# Patient Record
Sex: FEMALE | Race: ASIAN | NOT HISPANIC OR LATINO | URBAN - METROPOLITAN AREA
[De-identification: names, ages, dates, MRNs, and addresses within clinical notes are randomized per-mention and may not be internally consistent; named-entity substitution may affect disease eponyms.]

---

## 2024-01-30 RX ORDER — ACETAMINOPHEN 500 MG
1 TABLET ORAL
Refills: 0 | DISCHARGE
Start: 2024-01-30

## 2024-01-30 RX ORDER — ONDANSETRON 8 MG/1
1 TABLET, FILM COATED ORAL
Refills: 0 | DISCHARGE
Start: 2024-01-30

## 2024-02-02 ENCOUNTER — INPATIENT (INPATIENT)
Facility: HOSPITAL | Age: 34
LOS: 4 days | Discharge: HOME | End: 2024-02-07
Attending: INTERNAL MEDICINE | Admitting: INTERNAL MEDICINE
Payer: MEDICAID

## 2024-02-02 VITALS
HEART RATE: 137 BPM | OXYGEN SATURATION: 99 % | TEMPERATURE: 103 F | SYSTOLIC BLOOD PRESSURE: 105 MMHG | DIASTOLIC BLOOD PRESSURE: 66 MMHG | RESPIRATION RATE: 18 BRPM

## 2024-02-02 DIAGNOSIS — L03.213 PERIORBITAL CELLULITIS: ICD-10-CM

## 2024-02-02 LAB
ALBUMIN SERPL ELPH-MCNC: 3.5 G/DL — SIGNIFICANT CHANGE UP (ref 3.3–5)
ALP SERPL-CCNC: 336 U/L — HIGH (ref 40–120)
ALT FLD-CCNC: 16 U/L — SIGNIFICANT CHANGE UP (ref 4–33)
ANION GAP SERPL CALC-SCNC: 16 MMOL/L — HIGH (ref 7–14)
ANISOCYTOSIS BLD QL: SLIGHT — SIGNIFICANT CHANGE UP
APTT BLD: 36.9 SEC — HIGH (ref 24.5–35.6)
AST SERPL-CCNC: 16 U/L — SIGNIFICANT CHANGE UP (ref 4–32)
BASE EXCESS BLDV CALC-SCNC: -2.8 MMOL/L — LOW (ref -2–3)
BASOPHILS # BLD AUTO: 0 K/UL — SIGNIFICANT CHANGE UP (ref 0–0.2)
BASOPHILS NFR BLD AUTO: 0 % — SIGNIFICANT CHANGE UP (ref 0–2)
BILIRUB SERPL-MCNC: 0.6 MG/DL — SIGNIFICANT CHANGE UP (ref 0.2–1.2)
BLD GP AB SCN SERPL QL: NEGATIVE — SIGNIFICANT CHANGE UP
BLOOD GAS VENOUS COMPREHENSIVE RESULT: SIGNIFICANT CHANGE UP
BUN SERPL-MCNC: 12 MG/DL — SIGNIFICANT CHANGE UP (ref 7–23)
CALCIUM SERPL-MCNC: 8.9 MG/DL — SIGNIFICANT CHANGE UP (ref 8.4–10.5)
CHLORIDE BLDV-SCNC: 98 MMOL/L — SIGNIFICANT CHANGE UP (ref 96–108)
CHLORIDE SERPL-SCNC: 93 MMOL/L — LOW (ref 98–107)
CO2 BLDV-SCNC: 22.8 MMOL/L — SIGNIFICANT CHANGE UP (ref 22–26)
CO2 SERPL-SCNC: 20 MMOL/L — LOW (ref 22–31)
CREAT SERPL-MCNC: 0.72 MG/DL — SIGNIFICANT CHANGE UP (ref 0.5–1.3)
EGFR: 114 ML/MIN/1.73M2 — SIGNIFICANT CHANGE UP
EOSINOPHIL # BLD AUTO: 0 K/UL — SIGNIFICANT CHANGE UP (ref 0–0.5)
EOSINOPHIL NFR BLD AUTO: 0 % — SIGNIFICANT CHANGE UP (ref 0–6)
GAS PNL BLDV: 127 MMOL/L — LOW (ref 136–145)
GAS PNL BLDV: SIGNIFICANT CHANGE UP
GLUCOSE BLDV-MCNC: 103 MG/DL — HIGH (ref 70–99)
GLUCOSE SERPL-MCNC: 101 MG/DL — HIGH (ref 70–99)
HCG SERPL-ACNC: <1 MIU/ML — SIGNIFICANT CHANGE UP
HCO3 BLDV-SCNC: 22 MMOL/L — SIGNIFICANT CHANGE UP (ref 22–29)
HCT VFR BLD CALC: 19 % — CRITICAL LOW (ref 34.5–45)
HCT VFR BLDA CALC: 20 % — CRITICAL LOW (ref 34.5–46.5)
HGB BLD CALC-MCNC: 6.6 G/DL — CRITICAL LOW (ref 11.7–16.1)
HGB BLD-MCNC: 6.5 G/DL — CRITICAL LOW (ref 11.5–15.5)
HYPOCHROMIA BLD QL: SLIGHT — SIGNIFICANT CHANGE UP
IANC: 0.02 K/UL — LOW (ref 1.8–7.4)
INR BLD: 1.18 RATIO — SIGNIFICANT CHANGE UP (ref 0.85–1.18)
LACTATE BLDV-MCNC: 1 MMOL/L — SIGNIFICANT CHANGE UP (ref 0.5–2)
LYMPHOCYTES # BLD AUTO: 0.36 K/UL — LOW (ref 1–3.3)
LYMPHOCYTES # BLD AUTO: 92.7 % — HIGH (ref 13–44)
MACROCYTES BLD QL: SLIGHT — SIGNIFICANT CHANGE UP
MCHC RBC-ENTMCNC: 31.4 PG — SIGNIFICANT CHANGE UP (ref 27–34)
MCHC RBC-ENTMCNC: 34.2 GM/DL — SIGNIFICANT CHANGE UP (ref 32–36)
MCV RBC AUTO: 91.8 FL — SIGNIFICANT CHANGE UP (ref 80–100)
MONOCYTES # BLD AUTO: 0.02 K/UL — SIGNIFICANT CHANGE UP (ref 0–0.9)
MONOCYTES NFR BLD AUTO: 6.3 % — SIGNIFICANT CHANGE UP (ref 2–14)
NEUTROPHILS # BLD AUTO: 0 K/UL — LOW (ref 1.8–7.4)
NEUTROPHILS NFR BLD AUTO: 1 % — LOW (ref 43–77)
OVALOCYTES BLD QL SMEAR: SLIGHT — SIGNIFICANT CHANGE UP
PCO2 BLDV: 35 MMHG — LOW (ref 39–52)
PH BLDV: 7.4 — SIGNIFICANT CHANGE UP (ref 7.32–7.43)
PLAT MORPH BLD: NORMAL — SIGNIFICANT CHANGE UP
PLATELET # BLD AUTO: 9 K/UL — CRITICAL LOW (ref 150–400)
PLATELET COUNT - ESTIMATE: ABNORMAL
PO2 BLDV: 23 MMHG — LOW (ref 25–45)
POLYCHROMASIA BLD QL SMEAR: SLIGHT — SIGNIFICANT CHANGE UP
POTASSIUM BLDV-SCNC: 3.8 MMOL/L — SIGNIFICANT CHANGE UP (ref 3.5–5.1)
POTASSIUM SERPL-MCNC: 3.8 MMOL/L — SIGNIFICANT CHANGE UP (ref 3.5–5.3)
POTASSIUM SERPL-SCNC: 3.8 MMOL/L — SIGNIFICANT CHANGE UP (ref 3.5–5.3)
PROT SERPL-MCNC: 7.7 G/DL — SIGNIFICANT CHANGE UP (ref 6–8.3)
PROTHROM AB SERPL-ACNC: 13.3 SEC — HIGH (ref 9.5–13)
RBC # BLD: 2.07 M/UL — LOW (ref 3.8–5.2)
RBC # FLD: 15.9 % — HIGH (ref 10.3–14.5)
RBC BLD AUTO: ABNORMAL
RH IG SCN BLD-IMP: POSITIVE — SIGNIFICANT CHANGE UP
RH IG SCN BLD-IMP: POSITIVE — SIGNIFICANT CHANGE UP
SAO2 % BLDV: 35.3 % — LOW (ref 67–88)
SODIUM SERPL-SCNC: 129 MMOL/L — LOW (ref 135–145)
TROPONIN T, HIGH SENSITIVITY RESULT: 10 NG/L — SIGNIFICANT CHANGE UP
TROPONIN T, HIGH SENSITIVITY RESULT: 10 NG/L — SIGNIFICANT CHANGE UP
WBC # BLD: 0.39 K/UL — CRITICAL LOW (ref 3.8–10.5)
WBC # FLD AUTO: 0.39 K/UL — CRITICAL LOW (ref 3.8–10.5)

## 2024-02-02 PROCEDURE — 70450 CT HEAD/BRAIN W/O DYE: CPT | Mod: 26,MA

## 2024-02-02 PROCEDURE — 99285 EMERGENCY DEPT VISIT HI MDM: CPT

## 2024-02-02 PROCEDURE — 99223 1ST HOSP IP/OBS HIGH 75: CPT

## 2024-02-02 PROCEDURE — 70481 CT ORBIT/EAR/FOSSA W/DYE: CPT | Mod: 26,59,MA

## 2024-02-02 PROCEDURE — 99418 PROLNG IP/OBS E/M EA 15 MIN: CPT

## 2024-02-02 PROCEDURE — 71045 X-RAY EXAM CHEST 1 VIEW: CPT | Mod: 26

## 2024-02-02 RX ORDER — SODIUM CHLORIDE 9 MG/ML
2000 INJECTION INTRAMUSCULAR; INTRAVENOUS; SUBCUTANEOUS ONCE
Refills: 0 | Status: COMPLETED | OUTPATIENT
Start: 2024-02-02 | End: 2024-02-02

## 2024-02-02 RX ORDER — ONDANSETRON 8 MG/1
4 TABLET, FILM COATED ORAL ONCE
Refills: 0 | Status: COMPLETED | OUTPATIENT
Start: 2024-02-02 | End: 2024-02-02

## 2024-02-02 RX ORDER — ACETAMINOPHEN 500 MG
1000 TABLET ORAL ONCE
Refills: 0 | Status: COMPLETED | OUTPATIENT
Start: 2024-02-02 | End: 2024-02-02

## 2024-02-02 RX ORDER — IBUPROFEN 200 MG
600 TABLET ORAL ONCE
Refills: 0 | Status: COMPLETED | OUTPATIENT
Start: 2024-02-02 | End: 2024-02-02

## 2024-02-02 RX ORDER — VANCOMYCIN HCL 1 G
1000 VIAL (EA) INTRAVENOUS ONCE
Refills: 0 | Status: COMPLETED | OUTPATIENT
Start: 2024-02-02 | End: 2024-02-02

## 2024-02-02 RX ORDER — PIPERACILLIN AND TAZOBACTAM 4; .5 G/20ML; G/20ML
3.38 INJECTION, POWDER, LYOPHILIZED, FOR SOLUTION INTRAVENOUS ONCE
Refills: 0 | Status: COMPLETED | OUTPATIENT
Start: 2024-02-02 | End: 2024-02-02

## 2024-02-02 RX ORDER — SODIUM CHLORIDE 9 MG/ML
1000 INJECTION INTRAMUSCULAR; INTRAVENOUS; SUBCUTANEOUS ONCE
Refills: 0 | Status: COMPLETED | OUTPATIENT
Start: 2024-02-02 | End: 2024-02-02

## 2024-02-02 RX ADMIN — Medication 250 MILLIGRAM(S): at 18:59

## 2024-02-02 RX ADMIN — Medication 600 MILLIGRAM(S): at 21:09

## 2024-02-02 RX ADMIN — SODIUM CHLORIDE 1000 MILLILITER(S): 9 INJECTION INTRAMUSCULAR; INTRAVENOUS; SUBCUTANEOUS at 22:02

## 2024-02-02 RX ADMIN — PIPERACILLIN AND TAZOBACTAM 200 GRAM(S): 4; .5 INJECTION, POWDER, LYOPHILIZED, FOR SOLUTION INTRAVENOUS at 18:01

## 2024-02-02 RX ADMIN — ONDANSETRON 4 MILLIGRAM(S): 8 TABLET, FILM COATED ORAL at 18:59

## 2024-02-02 RX ADMIN — Medication 400 MILLIGRAM(S): at 18:01

## 2024-02-02 RX ADMIN — SODIUM CHLORIDE 2000 MILLILITER(S): 9 INJECTION INTRAMUSCULAR; INTRAVENOUS; SUBCUTANEOUS at 18:01

## 2024-02-02 NOTE — ED PROVIDER NOTE - OBJECTIVE STATEMENT
33-year-old female, history of breast cancer on chemo, presents with left eye swelling, fever, vomiting.  Reports that she has had the left eye swelling and redness for a while now but it recently flared up.  Started vomiting and having fever 2 days ago, has been taking Tylenol and Zofran with minimal relief.  Complaining of mouth sores.  No chest pain, shortness of breath, abdominal pain.

## 2024-02-02 NOTE — ED ADULT NURSE NOTE - NSFALLCONCLUSION_ED_ALL_ED
Detail Level: Detailed
Add 76282 Cpt? (Important Note: In 2017 The Use Of 64946 Is Being Tracked By Cms To Determine Future Global Period Reimbursement For Global Periods): yes
Universal Safety Interventions

## 2024-02-02 NOTE — ED ADULT NURSE REASSESSMENT NOTE - NS ED NURSE REASSESS COMMENT FT1
Patient resting comfortably on stretcher. Not in acute distress. Rectal temp is 100 . MD Carmichael made aware. Confirmatory type and screen result still in process. Pending blood transfusion.

## 2024-02-02 NOTE — ED ADULT NURSE NOTE - PRIMARY CARE PROVIDER
no lesions,  no deformities,  no traumatic injuries,  no significant scars are present,  chest wall non-tender,  no masses present, breathing is unlabored without accessory muscle use,normal breath sounds
unknown

## 2024-02-02 NOTE — ED ADULT TRIAGE NOTE - CHIEF COMPLAINT QUOTE
Pt c/o vomiting x 2 days. L eye red, swollen x 2 days as per . Febrile in triage. PHx breast Ca on chemo

## 2024-02-02 NOTE — ED PROVIDER NOTE - PROGRESS NOTE DETAILS
Patient found to be anemic, receiving 2 U prbcs currently. On reevaluation by me, patient does not have pain with EOM, no changes in visual acuity. CT states preseptal cellulitis and patient received abx.    Jany Klein MD, PGY3

## 2024-02-02 NOTE — ED PROVIDER NOTE - PHYSICAL EXAMINATION
Physical Exam:  Gen: NAD, AOx3, appears older than stated age  Head: NCAT  HEENT: EOMI but pain with movement, red L conjunctiva and skin around the eyes with accompanying swelling, tongue midline with thrush  Lung: CTAB, no respiratory distress, no wheezes/rhonchi/rales B/L, speaking in full sentences  CV: RRR, no murmurs, rubs or gallops  Abd: soft, NT, ND, no guarding, no rigidity, no rebound tenderness, no CVA tenderness   MSK: no visible deformities, ROM normal in UE/LE, no back pain  Neuro: No focal sensory or motor deficits  Skin: Warm, well perfused, no rash, no leg swelling  Psych: normal affect, calm

## 2024-02-02 NOTE — ED ADULT NURSE NOTE - OBJECTIVE STATEMENT
Pt is alert and orientedx4, ambulatory at baseline. Pt presents to the ED with fevers and swelling to left eye. Pt has PMHx of breast CA on chemo (left chest wall port unaccessed). Pt presents with left swollen eye that is red and draining (denies vision loss). Pt also has oral thrush noted. Pt Orally febrile, BP stable. Pt denies chest pain, shortness of breath, dyspnea on exertion, breathing is unlabored and even. Pt endorses nausea and vomiting 20G IV placed in LAC, labs drawn, awaiting further orders. Call bell within reach, bed in lowest position, will continue to monitor.

## 2024-02-02 NOTE — ED ADULT NURSE REASSESSMENT NOTE - NS ED NURSE REASSESS COMMENT FT1
Patient resting in no distress at this time, ambulated to the bathroom without difficulty. Awaiting blood transfusion.

## 2024-02-02 NOTE — ED PROVIDER NOTE - CLINICAL SUMMARY MEDICAL DECISION MAKING FREE TEXT BOX
33-year-old female, history of breast cancer on chemo, presents with left eye swelling, fever, vomiting. Febrile and tachycardic on vitals. Physical exam significant for EOMI but pain with movement, red L conjunctiva and skin around the eyes with accompanying swelling, tongue midline with thrush. Concern for orbital cellulitis as source for sepsis vs viral etiology vs bacterial etiology 2/2 immunocompromised state. Will obtain sepsis labs, cxr, give abx, tylenol, reassess

## 2024-02-02 NOTE — ED PROVIDER NOTE - ATTENDING CONTRIBUTION TO CARE
The patient is a 33y Female who has a past medical and surgery history of Breast CA on chemo PTED c/o vomiting x 2 days with fever L eye red, swollen x 2 days as per . Febrile in triage.    Vital Signs Last 24 Hrs  T(F): 103 HR: 137 BP: 105/66 RR: 18 SpO2: 99% (02 Feb 2024 16:49) (99% - 99%)  PE: as described; my additions and exceptions are noted in the chart    DATA:  EKG: pending at time of evaluation  LAB: see pullset    IMPRESSION/RISK:  Dx=  sepsis ? from eye   Consideration include labs sepsis w/u ct head/orbits BSABx in face of neutropenia   Plan  as above CT brain and orbits for abscess or mass   TBA

## 2024-02-03 DIAGNOSIS — Z29.9 ENCOUNTER FOR PROPHYLACTIC MEASURES, UNSPECIFIED: ICD-10-CM

## 2024-02-03 DIAGNOSIS — B37.0 CANDIDAL STOMATITIS: ICD-10-CM

## 2024-02-03 DIAGNOSIS — L03.213 PERIORBITAL CELLULITIS: ICD-10-CM

## 2024-02-03 DIAGNOSIS — E87.1 HYPO-OSMOLALITY AND HYPONATREMIA: ICD-10-CM

## 2024-02-03 DIAGNOSIS — D69.6 THROMBOCYTOPENIA, UNSPECIFIED: ICD-10-CM

## 2024-02-03 DIAGNOSIS — D64.9 ANEMIA, UNSPECIFIED: ICD-10-CM

## 2024-02-03 DIAGNOSIS — C50.919 MALIGNANT NEOPLASM OF UNSPECIFIED SITE OF UNSPECIFIED FEMALE BREAST: ICD-10-CM

## 2024-02-03 DIAGNOSIS — D70.9 NEUTROPENIA, UNSPECIFIED: ICD-10-CM

## 2024-02-03 DIAGNOSIS — R74.8 ABNORMAL LEVELS OF OTHER SERUM ENZYMES: ICD-10-CM

## 2024-02-03 DIAGNOSIS — C50.919 MALIGNANT NEOPLASM OF UNSPECIFIED SITE OF UNSPECIFIED FEMALE BREAST: Chronic | ICD-10-CM

## 2024-02-03 DIAGNOSIS — Z98.890 OTHER SPECIFIED POSTPROCEDURAL STATES: Chronic | ICD-10-CM

## 2024-02-03 DIAGNOSIS — Z95.828 PRESENCE OF OTHER VASCULAR IMPLANTS AND GRAFTS: Chronic | ICD-10-CM

## 2024-02-03 LAB
A1C WITH ESTIMATED AVERAGE GLUCOSE RESULT: 5.4 % — SIGNIFICANT CHANGE UP (ref 4–5.6)
ALBUMIN SERPL ELPH-MCNC: 2.6 G/DL — LOW (ref 3.3–5)
ALP SERPL-CCNC: 222 U/L — HIGH (ref 40–120)
ALT FLD-CCNC: 11 U/L — SIGNIFICANT CHANGE UP (ref 4–33)
ANION GAP SERPL CALC-SCNC: 11 MMOL/L — SIGNIFICANT CHANGE UP (ref 7–14)
APTT BLD: 35.2 SEC — SIGNIFICANT CHANGE UP (ref 24.5–35.6)
AST SERPL-CCNC: 12 U/L — SIGNIFICANT CHANGE UP (ref 4–32)
B PERT DNA SPEC QL NAA+PROBE: SIGNIFICANT CHANGE UP
B PERT+PARAPERT DNA PNL SPEC NAA+PROBE: SIGNIFICANT CHANGE UP
BASOPHILS # BLD AUTO: 0 K/UL — SIGNIFICANT CHANGE UP (ref 0–0.2)
BASOPHILS NFR BLD AUTO: 0 % — SIGNIFICANT CHANGE UP (ref 0–2)
BILIRUB SERPL-MCNC: 0.9 MG/DL — SIGNIFICANT CHANGE UP (ref 0.2–1.2)
BLD GP AB SCN SERPL QL: NEGATIVE — SIGNIFICANT CHANGE UP
BORDETELLA PARAPERTUSSIS (RAPRVP): SIGNIFICANT CHANGE UP
BUN SERPL-MCNC: 9 MG/DL — SIGNIFICANT CHANGE UP (ref 7–23)
C PNEUM DNA SPEC QL NAA+PROBE: SIGNIFICANT CHANGE UP
CALCIUM SERPL-MCNC: 7.7 MG/DL — LOW (ref 8.4–10.5)
CHLORIDE SERPL-SCNC: 107 MMOL/L — SIGNIFICANT CHANGE UP (ref 98–107)
CHOLEST SERPL-MCNC: 138 MG/DL — SIGNIFICANT CHANGE UP
CLOSURE TME COLL+EPINEP BLD: 4 K/UL — CRITICAL LOW (ref 150–400)
CLOSURE TME COLL+EPINEP BLD: 40 K/UL — LOW (ref 150–400)
CO2 SERPL-SCNC: 17 MMOL/L — LOW (ref 22–31)
CREAT SERPL-MCNC: 0.54 MG/DL — SIGNIFICANT CHANGE UP (ref 0.5–1.3)
CULTURE RESULTS: SIGNIFICANT CHANGE UP
EGFR: 125 ML/MIN/1.73M2 — SIGNIFICANT CHANGE UP
EOSINOPHIL # BLD AUTO: 0 K/UL — SIGNIFICANT CHANGE UP (ref 0–0.5)
EOSINOPHIL # BLD AUTO: 0.01 K/UL — SIGNIFICANT CHANGE UP (ref 0–0.5)
EOSINOPHIL # BLD AUTO: 0.01 K/UL — SIGNIFICANT CHANGE UP (ref 0–0.5)
EOSINOPHIL NFR BLD AUTO: 0 % — SIGNIFICANT CHANGE UP (ref 0–6)
EOSINOPHIL NFR BLD AUTO: 2.9 % — SIGNIFICANT CHANGE UP (ref 0–6)
EOSINOPHIL NFR BLD AUTO: 3.4 % — SIGNIFICANT CHANGE UP (ref 0–6)
ESTIMATED AVERAGE GLUCOSE: 108 — SIGNIFICANT CHANGE UP
FERRITIN SERPL-MCNC: 409 NG/ML — HIGH (ref 15–150)
FLUAV SUBTYP SPEC NAA+PROBE: SIGNIFICANT CHANGE UP
FLUBV RNA SPEC QL NAA+PROBE: SIGNIFICANT CHANGE UP
GGT SERPL-CCNC: 213 U/L — HIGH (ref 5–36)
GLUCOSE SERPL-MCNC: 97 MG/DL — SIGNIFICANT CHANGE UP (ref 70–99)
HADV DNA SPEC QL NAA+PROBE: SIGNIFICANT CHANGE UP
HAPTOGLOB SERPL-MCNC: 276 MG/DL — HIGH (ref 34–200)
HCOV 229E RNA SPEC QL NAA+PROBE: SIGNIFICANT CHANGE UP
HCOV HKU1 RNA SPEC QL NAA+PROBE: SIGNIFICANT CHANGE UP
HCOV NL63 RNA SPEC QL NAA+PROBE: SIGNIFICANT CHANGE UP
HCOV OC43 RNA SPEC QL NAA+PROBE: SIGNIFICANT CHANGE UP
HCT VFR BLD CALC: 17.7 % — CRITICAL LOW (ref 34.5–45)
HCT VFR BLD CALC: 18.9 % — CRITICAL LOW (ref 34.5–45)
HCT VFR BLD CALC: 19.5 % — CRITICAL LOW (ref 34.5–45)
HDLC SERPL-MCNC: 28 MG/DL — LOW
HGB BLD-MCNC: 6.2 G/DL — CRITICAL LOW (ref 11.5–15.5)
HGB BLD-MCNC: 6.8 G/DL — CRITICAL LOW (ref 11.5–15.5)
HGB BLD-MCNC: 6.8 G/DL — CRITICAL LOW (ref 11.5–15.5)
HMPV RNA SPEC QL NAA+PROBE: SIGNIFICANT CHANGE UP
HPIV1 RNA SPEC QL NAA+PROBE: SIGNIFICANT CHANGE UP
HPIV2 RNA SPEC QL NAA+PROBE: SIGNIFICANT CHANGE UP
HPIV3 RNA SPEC QL NAA+PROBE: SIGNIFICANT CHANGE UP
HPIV4 RNA SPEC QL NAA+PROBE: SIGNIFICANT CHANGE UP
IANC: 0.01 K/UL — LOW (ref 1.8–7.4)
IANC: 0.02 K/UL — LOW (ref 1.8–7.4)
IANC: 0.02 K/UL — LOW (ref 1.8–7.4)
IMM GRANULOCYTES NFR BLD AUTO: 0 % — SIGNIFICANT CHANGE UP (ref 0–0.9)
IMM GRANULOCYTES NFR BLD AUTO: 0 % — SIGNIFICANT CHANGE UP (ref 0–0.9)
INR BLD: 1.13 RATIO — SIGNIFICANT CHANGE UP (ref 0.85–1.18)
IRON SATN MFR SERPL: 53 % — HIGH (ref 14–50)
IRON SATN MFR SERPL: 76 UG/DL — SIGNIFICANT CHANGE UP (ref 30–160)
LDH SERPL L TO P-CCNC: 123 U/L — LOW (ref 135–225)
LIPID PNL WITH DIRECT LDL SERPL: 89 MG/DL — SIGNIFICANT CHANGE UP
LYMPHOCYTES # BLD AUTO: 0.25 K/UL — LOW (ref 1–3.3)
LYMPHOCYTES # BLD AUTO: 0.27 K/UL — LOW (ref 1–3.3)
LYMPHOCYTES # BLD AUTO: 0.3 K/UL — LOW (ref 1–3.3)
LYMPHOCYTES # BLD AUTO: 85 % — HIGH (ref 13–44)
LYMPHOCYTES # BLD AUTO: 86.2 % — HIGH (ref 13–44)
LYMPHOCYTES # BLD AUTO: 88.2 % — HIGH (ref 13–44)
M PNEUMO DNA SPEC QL NAA+PROBE: SIGNIFICANT CHANGE UP
MANUAL SMEAR VERIFICATION: SIGNIFICANT CHANGE UP
MCHC RBC-ENTMCNC: 30.9 PG — SIGNIFICANT CHANGE UP (ref 27–34)
MCHC RBC-ENTMCNC: 31.3 PG — SIGNIFICANT CHANGE UP (ref 27–34)
MCHC RBC-ENTMCNC: 31.8 PG — SIGNIFICANT CHANGE UP (ref 27–34)
MCHC RBC-ENTMCNC: 34.9 GM/DL — SIGNIFICANT CHANGE UP (ref 32–36)
MCHC RBC-ENTMCNC: 35 GM/DL — SIGNIFICANT CHANGE UP (ref 32–36)
MCHC RBC-ENTMCNC: 36 GM/DL — SIGNIFICANT CHANGE UP (ref 32–36)
MCV RBC AUTO: 88.3 FL — SIGNIFICANT CHANGE UP (ref 80–100)
MCV RBC AUTO: 88.6 FL — SIGNIFICANT CHANGE UP (ref 80–100)
MCV RBC AUTO: 89.4 FL — SIGNIFICANT CHANGE UP (ref 80–100)
MONOCYTES # BLD AUTO: 0.01 K/UL — SIGNIFICANT CHANGE UP (ref 0–0.9)
MONOCYTES # BLD AUTO: 0.01 K/UL — SIGNIFICANT CHANGE UP (ref 0–0.9)
MONOCYTES # BLD AUTO: 0.02 K/UL — SIGNIFICANT CHANGE UP (ref 0–0.9)
MONOCYTES NFR BLD AUTO: 2.9 % — SIGNIFICANT CHANGE UP (ref 2–14)
MONOCYTES NFR BLD AUTO: 3.4 % — SIGNIFICANT CHANGE UP (ref 2–14)
MONOCYTES NFR BLD AUTO: 5 % — SIGNIFICANT CHANGE UP (ref 2–14)
MRSA PCR RESULT.: DETECTED
NEUTROPHILS # BLD AUTO: 0.02 K/UL — LOW (ref 1.8–7.4)
NEUTROPHILS NFR BLD AUTO: 6 % — LOW (ref 43–77)
NEUTROPHILS NFR BLD AUTO: 7 % — LOW (ref 43–77)
NEUTROPHILS NFR BLD AUTO: 7.5 % — LOW (ref 43–77)
NON HDL CHOLESTEROL: 110 MG/DL — SIGNIFICANT CHANGE UP
NRBC # BLD: 0 /100 WBCS — SIGNIFICANT CHANGE UP (ref 0–0)
NRBC # BLD: 0 /100 WBCS — SIGNIFICANT CHANGE UP (ref 0–0)
NRBC # FLD: 0 K/UL — SIGNIFICANT CHANGE UP (ref 0–0)
NRBC # FLD: 0 K/UL — SIGNIFICANT CHANGE UP (ref 0–0)
OSMOLALITY SERPL: 279 MOSM/KG — SIGNIFICANT CHANGE UP (ref 275–295)
OVALOCYTES BLD QL SMEAR: SLIGHT — SIGNIFICANT CHANGE UP
PLAT MORPH BLD: NORMAL — SIGNIFICANT CHANGE UP
PLATELET # BLD AUTO: 5 K/UL — CRITICAL LOW (ref 150–400)
PLATELET # BLD AUTO: 5 K/UL — CRITICAL LOW (ref 150–400)
PLATELET # BLD AUTO: 71 K/UL — LOW (ref 150–400)
PLATELET COUNT - ESTIMATE: ABNORMAL
POIKILOCYTOSIS BLD QL AUTO: SLIGHT — SIGNIFICANT CHANGE UP
POLYCHROMASIA BLD QL SMEAR: SLIGHT — SIGNIFICANT CHANGE UP
POTASSIUM SERPL-MCNC: 3.7 MMOL/L — SIGNIFICANT CHANGE UP (ref 3.5–5.3)
POTASSIUM SERPL-SCNC: 3.7 MMOL/L — SIGNIFICANT CHANGE UP (ref 3.5–5.3)
PROT SERPL-MCNC: 5.9 G/DL — LOW (ref 6–8.3)
PROTHROM AB SERPL-ACNC: 12.6 SEC — SIGNIFICANT CHANGE UP (ref 9.5–13)
RAPID RVP RESULT: SIGNIFICANT CHANGE UP
RBC # BLD: 1.98 M/UL — LOW (ref 3.8–5.2)
RBC # BLD: 2.14 M/UL — LOW (ref 3.8–5.2)
RBC # BLD: 2.14 M/UL — LOW (ref 3.8–5.2)
RBC # BLD: 2.2 M/UL — LOW (ref 3.8–5.2)
RBC # FLD: 17 % — HIGH (ref 10.3–14.5)
RBC # FLD: 17 % — HIGH (ref 10.3–14.5)
RBC # FLD: 17.2 % — HIGH (ref 10.3–14.5)
RBC BLD AUTO: ABNORMAL
RETICS #: 8.5 K/UL — LOW (ref 25–125)
RETICS/RBC NFR: 0.4 % — LOW (ref 0.5–2.5)
RH IG SCN BLD-IMP: POSITIVE — SIGNIFICANT CHANGE UP
RSV RNA SPEC QL NAA+PROBE: SIGNIFICANT CHANGE UP
RV+EV RNA SPEC QL NAA+PROBE: SIGNIFICANT CHANGE UP
S AUREUS DNA NOSE QL NAA+PROBE: DETECTED
SARS-COV-2 RNA SPEC QL NAA+PROBE: SIGNIFICANT CHANGE UP
SODIUM SERPL-SCNC: 135 MMOL/L — SIGNIFICANT CHANGE UP (ref 135–145)
SPECIMEN SOURCE: SIGNIFICANT CHANGE UP
TIBC SERPL-MCNC: 144 UG/DL — LOW (ref 220–430)
TRANSFERRIN SERPL-MCNC: 122 MG/DL — LOW (ref 200–360)
TRIGL SERPL-MCNC: 103 MG/DL — SIGNIFICANT CHANGE UP
UIBC SERPL-MCNC: 68 UG/DL — LOW (ref 110–370)
URATE SERPL-MCNC: 2.7 MG/DL — SIGNIFICANT CHANGE UP (ref 2.5–7)
VARIANT LYMPHS # BLD: 2.5 % — SIGNIFICANT CHANGE UP (ref 0–6)
WBC # BLD: 0.29 K/UL — CRITICAL LOW (ref 3.8–10.5)
WBC # BLD: 0.32 K/UL — CRITICAL LOW (ref 3.8–10.5)
WBC # BLD: 0.34 K/UL — CRITICAL LOW (ref 3.8–10.5)
WBC # FLD AUTO: 0.29 K/UL — CRITICAL LOW (ref 3.8–10.5)
WBC # FLD AUTO: 0.32 K/UL — CRITICAL LOW (ref 3.8–10.5)
WBC # FLD AUTO: 0.34 K/UL — CRITICAL LOW (ref 3.8–10.5)

## 2024-02-03 PROCEDURE — 99222 1ST HOSP IP/OBS MODERATE 55: CPT

## 2024-02-03 PROCEDURE — 86078 PHYS BLOOD BANK SERV REACTJ: CPT

## 2024-02-03 RX ORDER — CEFEPIME 1 G/1
2000 INJECTION, POWDER, FOR SOLUTION INTRAMUSCULAR; INTRAVENOUS EVERY 8 HOURS
Refills: 0 | Status: DISCONTINUED | OUTPATIENT
Start: 2024-02-03 | End: 2024-02-07

## 2024-02-03 RX ORDER — LANOLIN ALCOHOL/MO/W.PET/CERES
3 CREAM (GRAM) TOPICAL AT BEDTIME
Refills: 0 | Status: DISCONTINUED | OUTPATIENT
Start: 2024-02-03 | End: 2024-02-07

## 2024-02-03 RX ORDER — AMPICILLIN SODIUM AND SULBACTAM SODIUM 250; 125 MG/ML; MG/ML
INJECTION, POWDER, FOR SUSPENSION INTRAMUSCULAR; INTRAVENOUS
Refills: 0 | Status: DISCONTINUED | OUTPATIENT
Start: 2024-02-03 | End: 2024-02-03

## 2024-02-03 RX ORDER — NYSTATIN 500MM UNIT
500000 POWDER (EA) MISCELLANEOUS THREE TIMES A DAY
Refills: 0 | Status: DISCONTINUED | OUTPATIENT
Start: 2024-02-03 | End: 2024-02-07

## 2024-02-03 RX ORDER — DIPHENHYDRAMINE HCL 50 MG
25 CAPSULE ORAL ONCE
Refills: 0 | Status: COMPLETED | OUTPATIENT
Start: 2024-02-03 | End: 2024-02-03

## 2024-02-03 RX ORDER — AMPICILLIN SODIUM AND SULBACTAM SODIUM 250; 125 MG/ML; MG/ML
3 INJECTION, POWDER, FOR SUSPENSION INTRAMUSCULAR; INTRAVENOUS ONCE
Refills: 0 | Status: COMPLETED | OUTPATIENT
Start: 2024-02-03 | End: 2024-02-03

## 2024-02-03 RX ORDER — VANCOMYCIN HCL 1 G
1000 VIAL (EA) INTRAVENOUS EVERY 12 HOURS
Refills: 0 | Status: DISCONTINUED | OUTPATIENT
Start: 2024-02-03 | End: 2024-02-03

## 2024-02-03 RX ORDER — ACETAMINOPHEN 500 MG
650 TABLET ORAL EVERY 6 HOURS
Refills: 0 | Status: DISCONTINUED | OUTPATIENT
Start: 2024-02-03 | End: 2024-02-07

## 2024-02-03 RX ORDER — ACETAMINOPHEN 500 MG
650 TABLET ORAL ONCE
Refills: 0 | Status: COMPLETED | OUTPATIENT
Start: 2024-02-03 | End: 2024-02-03

## 2024-02-03 RX ORDER — OXYCODONE HYDROCHLORIDE 5 MG/1
5 TABLET ORAL ONCE
Refills: 0 | Status: DISCONTINUED | OUTPATIENT
Start: 2024-02-03 | End: 2024-02-03

## 2024-02-03 RX ORDER — SODIUM CHLORIDE 9 MG/ML
1000 INJECTION INTRAMUSCULAR; INTRAVENOUS; SUBCUTANEOUS ONCE
Refills: 0 | Status: COMPLETED | OUTPATIENT
Start: 2024-02-03 | End: 2024-02-03

## 2024-02-03 RX ORDER — ACETAMINOPHEN 500 MG
1000 TABLET ORAL ONCE
Refills: 0 | Status: COMPLETED | OUTPATIENT
Start: 2024-02-03 | End: 2024-02-03

## 2024-02-03 RX ORDER — ACETAMINOPHEN 500 MG
325 TABLET ORAL ONCE
Refills: 0 | Status: COMPLETED | OUTPATIENT
Start: 2024-02-03 | End: 2024-02-03

## 2024-02-03 RX ORDER — AMPICILLIN SODIUM AND SULBACTAM SODIUM 250; 125 MG/ML; MG/ML
3 INJECTION, POWDER, FOR SUSPENSION INTRAMUSCULAR; INTRAVENOUS EVERY 6 HOURS
Refills: 0 | Status: DISCONTINUED | OUTPATIENT
Start: 2024-02-03 | End: 2024-02-03

## 2024-02-03 RX ADMIN — Medication 650 MILLIGRAM(S): at 22:17

## 2024-02-03 RX ADMIN — CEFEPIME 100 MILLIGRAM(S): 1 INJECTION, POWDER, FOR SOLUTION INTRAMUSCULAR; INTRAVENOUS at 13:07

## 2024-02-03 RX ADMIN — SODIUM CHLORIDE 1000 MILLILITER(S): 9 INJECTION INTRAMUSCULAR; INTRAVENOUS; SUBCUTANEOUS at 16:55

## 2024-02-03 RX ADMIN — Medication 250 MILLIGRAM(S): at 09:27

## 2024-02-03 RX ADMIN — OXYCODONE HYDROCHLORIDE 5 MILLIGRAM(S): 5 TABLET ORAL at 17:52

## 2024-02-03 RX ADMIN — Medication 400 MILLIGRAM(S): at 10:18

## 2024-02-03 RX ADMIN — Medication 650 MILLIGRAM(S): at 15:00

## 2024-02-03 RX ADMIN — Medication 25 MILLIGRAM(S): at 18:54

## 2024-02-03 RX ADMIN — Medication 650 MILLIGRAM(S): at 23:17

## 2024-02-03 RX ADMIN — AMPICILLIN SODIUM AND SULBACTAM SODIUM 200 GRAM(S): 250; 125 INJECTION, POWDER, FOR SUSPENSION INTRAMUSCULAR; INTRAVENOUS at 05:28

## 2024-02-03 RX ADMIN — Medication 1000 MILLIGRAM(S): at 11:18

## 2024-02-03 RX ADMIN — Medication 650 MILLIGRAM(S): at 18:53

## 2024-02-03 RX ADMIN — CEFEPIME 100 MILLIGRAM(S): 1 INJECTION, POWDER, FOR SOLUTION INTRAMUSCULAR; INTRAVENOUS at 21:24

## 2024-02-03 RX ADMIN — Medication 25 MILLIGRAM(S): at 23:35

## 2024-02-03 RX ADMIN — Medication 650 MILLIGRAM(S): at 16:00

## 2024-02-03 RX ADMIN — Medication 325 MILLIGRAM(S): at 23:35

## 2024-02-03 RX ADMIN — Medication 500000 UNIT(S): at 22:18

## 2024-02-03 NOTE — PHARMACOTHERAPY INTERVENTION NOTE - COMMENTS
Medication history is incomplete. Medication list updated in Outpatient Medication Record (OMR) per [facility]. Please call [facility] [phone] if you have any questions.  Medication history is complete. Medication list updated in Outpatient Medication Record (OMR) per [facility], patient and spouse at bedside.   [facility]  in [facility] [place] ID# [id number]  Please call spectra [phone] if you have any questions.

## 2024-02-03 NOTE — CONSULT NOTE ADULT - ATTENDING COMMENTS
33 F with Breast cancer (right breast s/p surgery including axillary node dissection, on chemotherapy last session 1/14/24) presents with redness and swelling of the L eye  Fever, neutropenia  CXR clear  CT L preseptal cellulitis without orbital involvement  RVP neg  L sided mediport  Preseptal cellulitis  Overall, Neutropenic fever, preseptal cellulitis  - Cefepime 2g q 8  - DC Vanco  - F/U MRSA PCR  - Monitor site for improvement  - Trend ANC/WBC  - F/U BCXs  - F/U MRSA PCR    Valentino Sofia MD  Contact on TEAMS messaging from 9am - 5pm  From 5pm-9am, on weekends, or if no response call [phone]    I was physically present for the key portions of the evaluation and management service provided. I saw and examined the patient. I agree with the above history, physical, and plan except for any discrepancies which I have documented in “Attending Statements.” Please refer to “Attending Statements” for final plan. 33 F with Breast cancer (right breast s/p surgery including axillary node dissection, on chemotherapy last session 1/14/24) presents with redness and swelling of the L eye  Fever, neutropenia  CXR clear  CT L preseptal cellulitis without orbital involvement  RVP neg  L sided mediport  Preseptal cellulitis  Overall, Neutropenic fever, preseptal cellulitis  - Cefepime 2g q 8  - DC Vanco  - F/U MRSA PCR  - Monitor site for improvement  - Trend ANC/WBC  - F/U BCXs  - F/U MRSA PCR  - Would obtain Ophtho eval--presentation seems somewhat atypical for a normal preseptal cellulitis (minimal pain)    Valentino Sofia MD  Contact on TEAMS messaging from 9am - 5pm  From 5pm-9am, on weekends, or if no response call [phone]    I was physically present for the key portions of the evaluation and management service provided. I saw and examined the patient. I agree with the above history, physical, and plan except for any discrepancies which I have documented in “Attending Statements.” Please refer to “Attending Statements” for final plan.

## 2024-02-03 NOTE — CONSULT NOTE ADULT - SUBJECTIVE AND OBJECTIVE BOX
Patient is a 33y old  Female who presents with a chief complaint of neutropenia, anemia, pre-septal cellulitis (03 Feb 2024 11:04)      HPI:  Ms. Dc is a 33 year old F with history of breast cancer (right breast s/p surgery including axillary node dissection, chemotherapy) who presents with swelling, inflammation  presents with redness and swelling of the right eye for the last 1-2 days. She reports fever (102F), chills, excessive tearing. Denies any pain with extra-occular movement, vision problems, or purulent discharge. She denies any sick contacts. Per patient she has had inflammation of the left eye 1-2x before, but she has never required antibiotics. She recently started a course of antibiotics- augmentin yesterday and had 2 doses. Her last dose of chemotherapy was January 14th and she received an GCSF injection on January 15th. Her last chemotherapy date is scheduled for April 2024. CT orbit showed left sided pre-septal cellulitis, no evidence of orbital cellulitis or abscess. left mediport with tip at SVC, carious left sided third maxillary molar tooth. She also reports daily nausea and vomiting every day since her chemotherapy. EKG as interpreted by me shows the following: , ST, QTc 413 ms, no ST/T changes. She has previous ear infections in the past since she started chemotherapy.     Labs were sig for the following: WBC 0.39, ANC 0.00, decrease PLT count, 1% neutrophil, PT/PTT 36.9, Na 129, CL 93, CO2 20, AG 16, BUN/Cr 12/0.72, Alk phos 336, LA 1.0, Hb/HCT 6.6/19, trop 10->10.       Her oncologist is Dr. Nasir Gondal. She was given a dose of Vanc in the ED and 1 unit of PRBC.  (03 Feb 2024 00:32)       ROS:  Negative except for:    PAST MEDICAL & SURGICAL HISTORY:  Breast cancer      Neutropenia      H/O breast surgery      Breast cancer metastasized to axillary lymph node      Port-A-Cath in place          SOCIAL HISTORY:    FAMILY HISTORY:  No pertinent family history in first degree relatives        MEDICATIONS  (STANDING):  cefepime   IVPB 2000 milliGRAM(s) IV Intermittent every 8 hours  nystatin    Suspension 333803 Unit(s) Oral three times a day    MEDICATIONS  (PRN):  acetaminophen     Tablet .. 650 milliGRAM(s) Oral every 6 hours PRN Temp greater or equal to 38C (100.4F), Mild Pain (1 - 3)  melatonin 3 milliGRAM(s) Oral at bedtime PRN Insomnia      Allergies    No Known Allergies    Intolerances        Vital Signs Last 24 Hrs  T(C): 37.7 (03 Feb 2024 18:02), Max: 39.1 (03 Feb 2024 15:30)  T(F): 99.9 (03 Feb 2024 18:02), Max: 102.4 (03 Feb 2024 15:30)  HR: 104 (03 Feb 2024 18:02) (99 - 126)  BP: 126/62 (03 Feb 2024 18:02) (100/50 - 139/84)  BP(mean): --  RR: 16 (03 Feb 2024 18:02) (16 - 23)  SpO2: 99% (03 Feb 2024 18:02) (98% - 100%)    Parameters below as of 03 Feb 2024 18:02  Patient On (Oxygen Delivery Method): room air        REVIEW OF SYSTEMS:    CONSTITUTIONAL: No weakness, fevers or chills  EYES/ENT: No visual changes;  No vertigo or throat pain   NECK: No pain or stiffness  RESPIRATORY: No cough, wheezing, hemoptysis; No shortness of breath  CARDIOVASCULAR: No chest pain or palpitations  GASTROINTESTINAL: No abdominal or epigastric pain. No nausea, vomiting, or hematemesis; No diarrhea or constipation. No melena or hematochezia.  GENITOURINARY: No dysuria, frequency or hematuria  NEUROLOGICAL: No numbness or weakness  SKIN: No itching, burning, rashes, or lesions     PHYSICAL EXAM  General: adult in NAD  HEENT: clear oropharynx, anicteric sclera, pink conjunctiva  Neck: supple  CV: normal S1/S2 with no murmur rubs or gallops  Lungs: positive air movement b/l ant lungs,clear to auscultation, no wheezes, no rales  Abdomen: soft non-tender non-distended, no hepatosplenomegaly  Ext: no clubbing cyanosis or edema  Skin: no rashes and no petechiae  Neuro: alert and oriented X 4, no focal deficits      LABS:                          6.2    0.32  )-----------( 71       ( 03 Feb 2024 17:20 )             17.7         Mean Cell Volume : 89.4 fL  Mean Cell Hemoglobin : 31.3 pg  Mean Cell Hemoglobin Concentration : 35.0 gm/dL  Auto Neutrophil # : x  Auto Lymphocyte # : x  Auto Monocyte # : x  Auto Eosinophil # : x  Auto Basophil # : x  Auto Neutrophil % : x  Auto Lymphocyte % : x  Auto Monocyte % : x  Auto Eosinophil % : x  Auto Basophil % : x    Iron - Total Binding Capacity.: 144 ug/dL (02-03 @ 05:35)  Ferritin: 409 ng/mL (02-03 @ 05:35)  Reticulocyte Percent: 0.4 % (02-03 @ 05:35)    02-03    135  |  107  |  9   ----------------------------<  97  3.7   |  17<L>  |  0.54    Ca    7.7<L>      03 Feb 2024 05:35    TPro  5.9<L>  /  Alb  2.6<L>  /  TBili  0.9  /  DBili  x   /  AST  12  /  ALT  11  /  AlkPhos  222<H>  02-03      PT/INR - ( 03 Feb 2024 05:35 )   PT: 12.6 sec;   INR: 1.13 ratio         PTT - ( 03 Feb 2024 05:35 )  PTT:35.2 sec      BLOOD SMEAR INTERPRETATION:       RADIOLOGY & ADDITIONAL STUDIES:     Patient is a 33y old  Female who presents with a chief complaint of neutropenia, anemia, pre-septal cellulitis (03 Feb 2024 11:04)    History obtained via [facility]  [id number]    HPI:  Ms. Dc is a 33 year old F with history of Triple Negative breast cancer diagnosed who was receiving neoadjuvant chemotherapy. She completed 12 weeks of Taxol/Carboplatin and received Keytruda/Adriamycin/Cytoxan on 1/24/2024 and Udenyca (peg-filgrastim) on 1/25/2024 who developed swelling, inflammation  right eye for  1-2 days prior to admission associated with fever up to 102F, chills, excessive tearing. Denies any pain with extra-occular movement, vision problems, or purulent discharge. She denies any sick contacts. Per patient she has had inflammation of the left eye 1-2x before, but she has never required antibiotics. She was started on augmentin by oncologist day PTA last dose but presented to ER when developed fevers. she has a left mediport last accessed during chemo without any pain or erythema. She also reports daily nausea and vomiting every day since her chemotherapy.     Ms Dc was noted to be pancytopenic on admission with WBC 0.39 and platelets 9k. she denied any bruising or bleeding.    Her oncologist is Dr. Nasir Gondal. She was given a dose of Vanc in the ED and 1 unit of PRBC.  (03 Feb 2024 00:32)       PAST MEDICAL & SURGICAL HISTORY:  Breast cancer  Breast cancer metastasized to axillary lymph node    SOCIAL HISTORY: non-contributory    FAMILY HISTORY:  No pertinent family history in first degree relatives    MEDICATIONS  (STANDING):  cefepime   IVPB 2000 milliGRAM(s) IV Intermittent every 8 hours  nystatin    Suspension [id number] Unit(s) Oral three times a day    MEDICATIONS  (PRN):  acetaminophen     Tablet .. 650 milliGRAM(s) Oral every 6 hours PRN Temp greater or equal to 38C (100.4F), Mild Pain (1 - 3)  melatonin 3 milliGRAM(s) Oral at bedtime PRN Insomnia      Allergies    No Known Allergies    Intolerances        Vital Signs Last 24 Hrs  T(C): 37.7 (03 Feb 2024 18:02), Max: 39.1 (03 Feb 2024 15:30)  T(F): 99.9 (03 Feb 2024 18:02), Max: 102.4 (03 Feb 2024 15:30)  HR: 104 (03 Feb 2024 18:02) (99 - 126)  BP: 126/62 (03 Feb 2024 18:02) (100/50 - 139/84)  BP(mean): --  RR: 16 (03 Feb 2024 18:02) (16 - 23)  SpO2: 99% (03 Feb 2024 18:02) (98% - 100%)    Parameters below as of 03 Feb 2024 18:02  Patient On (Oxygen Delivery Method): room air        REVIEW OF SYSTEMS:    as per HPI      PHYSICAL EXAM  General: adult in NAD  HEENT:left eye swelling, conjunctiva erythematous, scab under left eye  Neck: supple  CV: normal S1/S2 with no murmur rubs or gallops  Lungs: positive air movement b/l ant lungs,clear to auscultation, no wheezes, no rales  Abdomen: soft non-tender non-distended, no hepatosplenomegaly  Ext: no clubbing cyanosis or edema  Skin: no rashes and no petechiae  Neuro: alert and oriented X 4, no focal deficits      LABS:                          6.2    0.32  )-----------( 71       ( 03 Feb 2024 17:20 )             17.7     Mean Cell Volume : 89.4 fL  Mean Cell Hemoglobin : 31.3 pg  Mean Cell Hemoglobin Concentration : 35.0 gm/dL    Iron - Total Binding Capacity.: 144 ug/dL (02-03 @ 05:35)  Ferritin: 409 ng/mL (02-03 @ 05:35)  Reticulocyte Percent: 0.4 % (02-03 @ 05:35)    02-03    135  |  107  |  9   ----------------------------<  97  3.7   |  17<L>  |  0.54    Ca    7.7<L>      03 Feb 2024 05:35    TPro  5.9<L>  /  Alb  2.6<L>  /  TBili  0.9  /  DBili  x   /  AST  12  /  ALT  11  /  AlkPhos  222<H>  02-03      PT/INR - ( 03 Feb 2024 05:35 )   PT: 12.6 sec;   INR: 1.13 ratio         PTT - ( 03 Feb 2024 05:35 )  PTT:35.2 sec      Lactate Dehydrogenase, Serum: 123 U/L (02.03.24 @ 05:35)    Haptoglobin, Serum: 276 mg/dL (02.03.24 @ 05:35)    Reticulocyte Count (02.03.24 @ 05:35)    RBC Count: 2.14 M/uL   Reticulocyte Percent: 0.4 %   Absolute Reticulocytes: 8.5 K/uL    < from: Xray Chest 1 View- PORTABLE-Urgent (02.02.24 @ 21:47) >  ACC: [id number] EXAM:  XR CHEST PORTABLE URGENT 1V   ORDERED BY: [facility]     PROCEDURE DATE:  02/02/2024      INTERPRETATION:  EXAMINATION: XR CHEST URGENT    CLINICAL INDICATION: sepsis    TECHNIQUE: Single frontal, portable view ofthe chest was obtained.    COMPARISON: None    FINDINGS:  Left chest wall CT compatible power infusion port with tip in SVC region.  Clear lungs. No pleural effusions or pneumothorax.  Cardiac and mediastinal silhouettes within normal limits for projection.  Midline normal caliber trachea.  Unremarkable osseous structures.    IMPRESSION:  Clear lungs.    --- End of Report ---      DAVID CABELLO MD; Resident Radiologist  This document has been electronically signed.  GARRETT IZAGUIRRE MD; Attending Radiologist  This document has been electronically signed. Feb  3 2024  9:10AM    < end of copied text >      BLOOD SMEAR INTERPRETATION:       RADIOLOGY & ADDITIONAL STUDIES:

## 2024-02-03 NOTE — H&P ADULT - REASON FOR ADMISSION
Dermatology Rooming Note    Dontae Weston's goals for this visit include:   Chief Complaint   Patient presents with     Derm Problem     Changing spot on chest - no personal or family hx of skin cancer.     Lila Christopher, CMA   neutropenia, anemia, pre-septal cellulitis

## 2024-02-03 NOTE — CONSULT NOTE ADULT - ASSESSMENT
34 y/o female with Triple Negative Breast Cancer receiving Neoadjuvant Chemotherapy most recently 1/24/2024 and received Udenyca 1/25/2024 who presents with febrile Neutropenia.

## 2024-02-03 NOTE — H&P ADULT - PROBLEM SELECTOR PLAN 4
PLT count is undetectable on labs   -PLT blue top ordered  -likely in the setting of chemotherapy  -consider IVIG and steroids if PLT<1 with risk for ITP  -obtain baseline PLT count from oncologist. PLT count 9  -likely in the setting of chemotherapy  -consider IVIG and steroids if drop from baseline.   -obtain baseline PLT count from oncologist.

## 2024-02-03 NOTE — CHART NOTE - NSCHARTNOTEFT_GEN_A_CORE
Background:     33-year-old female, history of breast cancer on chemo, presents with left eye swelling, fever, vomiting.  Reports that she has had the left eye swelling and redness for a while now but it recently flared up.  Started vomiting and having fever 2 days ago. Patient has intermittently febrile throughout admission, she was seen by ID and started on cefepime, blood cultures and urine culture were sent, RVP negative.    Patient was seen and using [facility] pacific  was used    Notified by RN that patient febrile to 102.9 15 minutes post transfusion of platelets (previously afebrile) Patient was seen at bedside, she is awake and alert, oriented x4.  She denies chest pain, anxiety or shortness of breath.  She endorses lower back pain which is not new and started approximately around her admission, she points to her lumbar area and attributes it to being in bed.     On exam, lungs are clear, patient is breathing comfortably on room air and speakin gin full sentences, patient is tachycardic to 120s which is attributed to her fever (tylenol and IVF ordered), her BP is within her normal values.     Concern for blood product transfusion reaction    Spoke to Dr. Cameron (covering blood bank fellow) who recommended evaluation for post transfusion reaction based on timing.   The following items were walked down to the lab    1. repeat type and screen  2. completed transfusion reaction report from forms on demand  3. copy of platelet administration record    Blood bank recommended not giving blood products unless emergent until labs were resulted (approximately 2 hours), if there is a clinical change ACP to contact blood bank for guidance.      Plan  1. transfusion reaction workup sent  2. repeat cbc for re-eval of plts and hgb  3. symptomatic management of fevers  4. c/w antibiotics    Discussed w/Dr. Pennington who is in agreement w/plan.    Also discussed w/Dr. Menchaca ([facility] heme/onc) who is recommending patient receive PRBC when safe to do so and be pre-medicated w.tylenol and benadryl for all future blood product administrations.    Addendum-spoke to Dr. Cameron from blood bank, labs w/o evidence of transfusion reaction & ok to proceed w/administering blood products however given high fever recommending repeat blood cultures to evaluate for septic transfusion reaction.     Repeat blood cultures ordered, 1 unit PRBC ordered w/premedication, discussed w/Dr. Pennington who is also recommending CT chestm abd and pelvis to further evaluate other possible etiologies of fever.      Discussed w/RN

## 2024-02-03 NOTE — H&P ADULT - ASSESSMENT
Ms. Dc is a 33 year old F with history of breast cancer (right breast s/p surgery including axillary node dissection, chemotherapy) who presents with swelling, inflammation  presents with redness and swelling of the right eye for the last 1-2 days.

## 2024-02-03 NOTE — PROGRESS NOTE ADULT - SUBJECTIVE AND OBJECTIVE BOX
SUBJECTIVE / OVERNIGHT EVENTS:pt seen and examined  02-03-24     MEDICATIONS  (STANDING):  acetaminophen     Tablet .. 325 milliGRAM(s) Oral once  cefepime   IVPB 2000 milliGRAM(s) IV Intermittent every 8 hours  nystatin    Suspension 939816 Unit(s) Oral three times a day    MEDICATIONS  (PRN):  acetaminophen     Tablet .. 650 milliGRAM(s) Oral every 6 hours PRN Temp greater or equal to 38C (100.4F), Mild Pain (1 - 3)  diphenhydrAMINE 25 milliGRAM(s) Oral once PRN Rash and/or Itching  melatonin 3 milliGRAM(s) Oral at bedtime PRN Insomnia    T(C): 37.1 (02-03-24 @ 22:41), Max: 39.1 (02-03-24 @ 15:30)  HR: 127 (02-03-24 @ 22:41) (99 - 129)  BP: 128/99 (02-03-24 @ 21:11) (100/62 - 139/84)  RR: 16 (02-03-24 @ 21:11) (16 - 20)  SpO2: 99% (02-03-24 @ 21:11) (98% - 100%)    CAPILLARY BLOOD GLUCOSE        I&O's Summary      Constitutional: No fever, fatigue  Skin: No rash.  Eyes: No recent vision problems or eye pain.  ENT: No congestion, ear pain, or sore throat.  Cardiovascular: No chest pain or palpation.  Respiratory: No cough, shortness of breath, congestion, or wheezing.  Gastrointestinal: No abdominal pain, nausea, vomiting, or diarrhea.  Genitourinary: No dysuria.  Musculoskeletal: No joint swelling.  Neurologic: No headache.    PHYSICAL EXAM:  GENERAL: NAD  EYES: left eye erythema+  NECK: Supple, No JVD  CHEST/LUNG: dec breath sounds at bases  HEART:  S1 , S2 +  ABDOMEN: soft , bs+  EXTREMITIES:  no edema  NEUROLOGY:alert awake      LABS:                        6.2    0.32  )-----------( 71       ( 03 Feb 2024 17:20 )             17.7     02-03    135  |  107  |  9   ----------------------------<  97  3.7   |  17<L>  |  0.54    Ca    7.7<L>      03 Feb 2024 05:35    TPro  5.9<L>  /  Alb  2.6<L>  /  TBili  0.9  /  DBili  x   /  AST  12  /  ALT  11  /  AlkPhos  222<H>  02-03    PT/INR - ( 03 Feb 2024 05:35 )   PT: 12.6 sec;   INR: 1.13 ratio         PTT - ( 03 Feb 2024 05:35 )  PTT:35.2 sec      Urinalysis Basic - ( 03 Feb 2024 05:35 )    Color: x / Appearance: x / SG: x / pH: x  Gluc: 97 mg/dL / Ketone: x  / Bili: x / Urobili: x   Blood: x / Protein: x / Nitrite: x   Leuk Esterase: x / RBC: x / WBC x   Sq Epi: x / Non Sq Epi: x / Bacteria: x        RADIOLOGY & ADDITIONAL TESTS:    Imaging Personally Reviewed:    Consultant(s) Notes Reviewed:      Care Discussed with Consultants/Other Providers:

## 2024-02-03 NOTE — H&P ADULT - NSICDXPASTSURGICALHX_GEN_ALL_CORE_FT
PAST SURGICAL HISTORY:  Breast cancer metastasized to axillary lymph node     H/O breast surgery     Port-A-Cath in place     
Attending Only

## 2024-02-03 NOTE — CONSULT NOTE ADULT - SUBJECTIVE AND OBJECTIVE BOX
MR#[id number]  PATIENT NAME:EDUARDO DC    DATE OF SERVICE: 02-03-24   Patient was seen and examined by Jose M Shelley MD on    02-03-24  Interim events noted.Consultant notes ,Labs,Telemetry reviewed by me       HOSPITAL COURSE: HPI:  Ms. Dc is a 33 year old F with history of breast cancer (right breast s/p surgery including axillary node dissection, chemotherapy) who presents with swelling, inflammation  presents with redness and swelling of the right eye for the last 1-2 days. She reports fever (102F), chills, excessive tearing. Denies any pain with extra-occular movement, vision problems, or purulent discharge. She denies any sick contacts. Per patient she has had inflammation of the left eye 1-2x before, but she has never required antibiotics. She recently started a course of antibiotics- augmentin yesterday and had 2 doses. Her last dose of chemotherapy was January 14th and she received an GCSF injection on January 15th. Her last chemotherapy date is scheduled for April 2024. CT orbit showed left sided pre-septal cellulitis, no evidence of orbital cellulitis or abscess. left mediport with tip at SVC, carious left sided third maxillary molar tooth. She also reports daily nausea and vomiting every day since her chemotherapy. EKG as interpreted by me shows the following: , ST, QTc 413 ms, no ST/T changes. She has previous ear infections in the past since she started chemotherapy.     Labs were sig for the following: WBC 0.39, ANC 0.00, decrease PLT count, 1% neutrophil, PT/PTT 36.9, Na 129, CL 93, CO2 20, AG 16, BUN/Cr 12/0.72, Alk phos 336, LA 1.0, Hb/HCT 6.6/19, trop 10->10.       Her oncologist is Dr. Nasir Gondal. She was given a dose of Vanc in the ED and 1 unit of PRBC.  (03 Feb 2024 00:32)      INTERIM EVENTS:Patient seen at bedside ,interim events noted.      PMH -reviewed admission note, no change since admission  HEART FAILURE: Acute[ ]Chronic[ ] Systolic[ ] Diastolic[ ] Combined Systolic and Diastolic[ ]  CAD[ ] CABG[ ] PCI[ ]  DEVICES[ ] PPM[ ] ICD[ ] ILR[ ]  ATRIAL FIBRILLATION[ ] Paroxysmal[ ] Permanent[ ] CHADS2-[  ]  BENTON[ ] CKD1[ ] CKD2[ ] CKD3[ ] CKD4[ ] ESRD[ ]  COPD[ ] HTN[ ]   DM[ ] Type1[ ] Type 2[ ]   CVA[ ] Paresis[ ]    AMBULATION: Assisted[ ] Cane/walker[ ] Independent[ ]    MEDICATIONS  (STANDING):  cefepime   IVPB 2000 milliGRAM(s) IV Intermittent every 8 hours  nystatin    Suspension 577253 Unit(s) Oral three times a day    MEDICATIONS  (PRN):  acetaminophen     Tablet .. 650 milliGRAM(s) Oral every 6 hours PRN Temp greater or equal to 38C (100.4F), Mild Pain (1 - 3)  melatonin 3 milliGRAM(s) Oral at bedtime PRN Insomnia            REVIEW OF SYSTEMS:  Constitutional: [ ] fever, [ ]weight loss,  [ ]fatigue [ ]weight gain  Eyes: [ ] visual changes  Respiratory: [ ]shortness of breath;  [ ] cough, [ ]wheezing, [ ]chills, [ ]hemoptysis  Cardiovascular: [ ] chest pain, [ ]palpitations, [ ]dizziness,  [ ]leg swelling[ ]orthopnea[ ]PND  Gastrointestinal: [ ] abdominal pain, [ ]nausea, [ ]vomiting,  [ ]diarrhea [ ]Constipation [ ]Melena  Genitourinary: [ ] dysuria, [ ] hematuria [ ]Thomas  Neurologic: [ ] headaches [ ] tremors[ ]weakness [ ]Paralysis Right[ ] Left[ ]  Skin: [ ] itching, [ ]burning, [ ] rashes  Endocrine: [ ] heat or cold intolerance  Musculoskeletal: [ ] joint pain or swelling; [ ] muscle, back, or extremity pain  Psychiatric: [ ] depression, [ ]anxiety, [ ]mood swings, or [ ]difficulty sleeping  Hematologic: [ ] easy bruising, [ ] bleeding gums    [ ] All remaining systems negative except as per above.   [ ]Unable to obtain.  [x] No change in ROS since admission      Vital Signs Last 24 Hrs  T(C): 38.2 (03 Feb 2024 21:11), Max: 39.1 (03 Feb 2024 15:30)  T(F): 100.8 (03 Feb 2024 21:11), Max: 102.4 (03 Feb 2024 15:30)  HR: 129 (03 Feb 2024 21:11) (99 - 129)  BP: 128/99 (03 Feb 2024 21:11) (100/62 - 139/84)  BP(mean): --  RR: 16 (03 Feb 2024 21:11) (16 - 23)  SpO2: 99% (03 Feb 2024 21:11) (98% - 100%)    Parameters below as of 03 Feb 2024 21:11  Patient On (Oxygen Delivery Method): room air      I&O's Summary      PHYSICAL EXAM:  General: No acute distress BMI-  HEENT: EOMI, PERRL  Neck: Supple, [ ] JVD  Lungs: Equal air entry bilaterally; [ ] rales [ ] wheezing [ ] rhonchi  Heart: Regular rate and rhythm; [x ] murmur   2/6 [ x] systolic [ ] diastolic [ ] radiation[ ] rubs [ ]  gallops  Abdomen: Nontender, bowel sounds present  Extremities: No clubbing, cyanosis, [ ] edema [ ]Pulses  equal and intact  Nervous system:  Alert & Oriented X3, no focal deficits  Psychiatric: Normal affect  Skin: No rashes or lesions    LABS:  02-03    135  |  107  |  9   ----------------------------<  97  3.7   |  17<L>  |  0.54    Ca    7.7<L>      03 Feb 2024 05:35    TPro  5.9<L>  /  Alb  2.6<L>  /  TBili  0.9  /  DBili  x   /  AST  12  /  ALT  11  /  AlkPhos  222<H>  02-03    Creatinine Trend: 0.54<--, 0.72<--                        6.2    0.32  )-----------( 71       ( 03 Feb 2024 17:20 )             17.7     PT/INR - ( 03 Feb 2024 05:35 )   PT: 12.6 sec;   INR: 1.13 ratio         PTT - ( 03 Feb 2024 05:35 )  PTT:35.2 sec

## 2024-02-03 NOTE — H&P ADULT - HISTORY OF PRESENT ILLNESS
Ms. Dc is a 33 year old F with history of breast cancer (right breast s/p surgery including axillary node dissection, chemotherapy) who presents with swelling, inflammation  presents with redness and swelling of the right eye for the last 1-2 days. She reports fever (102F), chills, excessive tearing. Denies any pain with extra-occular movement, vision problems, or purulent discharge. She denies any sick contacts. Per patient she has had inflammation of the left eye 1-2x before, but she has never required antibiotics. She recently started a course of antibiotics- augmentin yesterday and had 2 doses. Her last dose of chemotherapy was January 14th and she received an GCSF injection on January 15th. Her last chemotherapy date is scheduled for April 2024. CT orbit showed left sided pre-septal cellulitis, no evidence of orbital cellulitis or abscess. left mediport with tip at SVC, carious left sided third maxillary molar tooth. She also reports daily nausea and vomiting every day since her chemotherapy. EKG as interpreted by me shows the following: , ST, QTc 413 ms, no ST/T changes. She has previous ear infections in the past since she started chemotherapy.     Labs were sig for the following: WBC 0.39, ANC 0.00, decrease PLT count, 1% neutrophil, PT/PTT 36.9, Na 129, CL 93, CO2 20, AG 16, BUN/Cr 12/0.72, Alk phos 336, LA 1.0, Hb/HCT 6.6/19, trop 10->10.       Her oncologist is Dr. Nasir Gondal. She was given a dose of Vanc in the ED and 1 unit of PRBC.

## 2024-02-03 NOTE — H&P ADULT - PROBLEM SELECTOR PLAN 8
-thrush present in mouth   -Nystatin swish and spit ordered  -no s/s of dysphagia concerning for candidal esophagitis   -continue to monitor

## 2024-02-03 NOTE — CONSULT NOTE ADULT - SUBJECTIVE AND OBJECTIVE BOX
Patient is a 33y old  Female who presents with a chief complaint of neutropenia, anemia, pre-septal cellulitis (03 Feb 2024 00:32)    HPI:  33F with Breast cancer (right breast s/p surgery including axillary node dissection, on chemotherapy last session 1/14/24) presents with redness and swelling of the L eye for the last 1-2 days, found to be febrile 102F, , Pancytopenic, CXR clear, CT showing L preseptal cellulitis without orbital involvement or abscess, ID consulted for assistance.    Patient ---     prior hospital charts reviewed [  ]  primary team notes reviewed [  ]  other consultant notes reviewed [  ]    PAST MEDICAL & SURGICAL HISTORY:  Breast cancer      Neutropenia      H/O breast surgery      Breast cancer metastasized to axillary lymph node      Port-A-Cath in place          Allergies  No Known Allergies    ANTIMICROBIALS (past 90 days)  MEDICATIONS  (STANDING):    ampicillin/sulbactam  IVPB   200 mL/Hr IV Intermittent (02-03-24 @ 05:28)    piperacillin/tazobactam IVPB...   200 mL/Hr IV Intermittent (02-02-24 @ 18:01)    vancomycin  IVPB   250 mL/Hr IV Intermittent (02-03-24 @ 09:27)    vancomycin  IVPB.   250 mL/Hr IV Intermittent (02-02-24 @ 18:59)        ampicillin/sulbactam  IVPB    ampicillin/sulbactam  IVPB 3 every 6 hours  nystatin    Suspension [id number] three times a day  vancomycin  IVPB 1000 every 12 hours    MEDICATIONS  (STANDING):  acetaminophen     Tablet .. 650 every 6 hours PRN  melatonin 3 at bedtime PRN    SOCIAL HISTORY:       FAMILY HISTORY:  No pertinent family history in first degree relatives      REVIEW OF SYSTEMS  [  ] ROS unobtainable because:    [  ] All other systems negative except as noted below:	    Constitutional:  [ ] fever [ ] chills  [ ] weight loss  [ ] weakness  Skin:  [ ] rash [ ] phlebitis	  Eyes: [ ] icterus [ ] pain  [ ] discharge	  ENMT: [ ] sore throat  [ ] thrush [ ] ulcers [ ] exudates  Respiratory: [ ] dyspnea [ ] hemoptysis [ ] cough [ ] sputum	  Cardiovascular:  [ ] chest pain [ ] palpitations [ ] edema	  Gastrointestinal:  [ ] nausea [ ] vomiting [ ] diarrhea [ ] constipation [ ] pain	  Genitourinary:  [ ] dysuria [ ] frequency [ ] hematuria [ ] discharge [ ] flank pain  [ ] incontinence  Musculoskeletal:  [ ] myalgias [ ] arthralgias [ ] arthritis  [ ] back pain  Neurological:  [ ] headache [ ] seizures  [ ] confusion/altered mental status  Psychiatric:  [ ] anxiety [ ] depression	  Hematology/Lymphatics:  [ ] lymphadenopathy  Endocrine:  [ ] adrenal [ ] thyroid  Allergic/Immunologic:	 [ ] transplant [ ] seasonal    Vital Signs Last 24 Hrs  T(F): 101.9 (02-03-24 @ 09:30), Max: 103 (02-02-24 @ 16:49)  Vital Signs Last 24 Hrs  HR: 124 (02-03-24 @ 09:30) (99 - 137)  BP: 126/82 (02-03-24 @ 09:30) (97/60 - 139/84)  RR: 16 (02-03-24 @ 09:30)  SpO2: 100% (02-03-24 @ 09:30) (99% - 100%)  Wt(kg): --    PHYSICAL EXAM:                              6.8    0.34  )-----------( 5        ( 03 Feb 2024 05:35 )             18.9   02-03    135  |  107  |  9   ----------------------------<  97  3.7   |  17<L>  |  0.54    Ca    7.7<L>      03 Feb 2024 05:35    TPro  5.9<L>  /  Alb  2.6<L>  /  TBili  0.9  /  DBili  x   /  AST  12  /  ALT  11  /  AlkPhos  222<H>  02-03    Urinalysis Basic - ( 03 Feb 2024 05:35 )    Color: x / Appearance: x / SG: x / pH: x  Gluc: 97 mg/dL / Ketone: x  / Bili: x / Urobili: x   Blood: x / Protein: x / Nitrite: x   Leuk Esterase: x / RBC: x / WBC x   Sq Epi: x / Non Sq Epi: x / Bacteria: x    MICROBIOLOGY:              RADIOLOGY:  imaging below personally reviewed and agree with findings  < from: Xray Chest 1 View- PORTABLE-Urgent (02.02.24 @ 21:47) >  FINDINGS:  Left chest wall CT compatible power infusion port with tip in SVC region.  Clear lungs. No pleural effusions or pneumothorax.  Cardiac and mediastinal silhouettes within normal limits for projection.  Midline normal caliber trachea.  Unremarkable osseous structures.    IMPRESSION:  Clear lungs.    < end of copied text >  < from: CT Orbit w/ IV Cont (02.02.24 @ 18:39) >  IMPRESSION:    NONCONTRAST HEAD CT: No acute intracranial hemorrhage, mass effect, or   shift of the midline structures.    NONCONTRAST MAXILLOFACIAL CT: Left-sided preseptal cellulitis. No   evidence of orbital cellulitis or abscess.    < end of copied text >   Patient is a 33y old  Female who presents with a chief complaint of neutropenia, anemia, pre-septal cellulitis (03 Feb 2024 00:32)    HPI:  33F with Breast cancer (right breast s/p surgery including axillary node dissection, on chemotherapy last session 1/14/24) presents with redness and swelling of the L eye for the last 1-2 days, found to be febrile 102F, , Pancytopenic, CXR clear, CT showing L preseptal cellulitis without orbital involvement or abscess, ID consulted for assistance.    Manjula  [id number]    Worsening L eye swelling with redness, without pain  Fever and chills has improved since presenting  Patient reports no recent travel or contact lens use    prior hospital charts reviewed [ x]  primary team notes reviewed [ x ]  other consultant notes reviewed [ x ]    PAST MEDICAL & SURGICAL HISTORY:  Breast cancer      Neutropenia      H/O breast surgery      Breast cancer metastasized to axillary lymph node      Port-A-Cath in place          Allergies  No Known Allergies    ANTIMICROBIALS (past 90 days)  MEDICATIONS  (STANDING):    ampicillin/sulbactam  IVPB   200 mL/Hr IV Intermittent (02-03-24 @ 05:28)    piperacillin/tazobactam IVPB...   200 mL/Hr IV Intermittent (02-02-24 @ 18:01)    vancomycin  IVPB   250 mL/Hr IV Intermittent (02-03-24 @ 09:27)    vancomycin  IVPB.   250 mL/Hr IV Intermittent (02-02-24 @ 18:59)        ampicillin/sulbactam  IVPB    ampicillin/sulbactam  IVPB 3 every 6 hours  nystatin    Suspension [id number] three times a day  vancomycin  IVPB 1000 every 12 hours    MEDICATIONS  (STANDING):  acetaminophen     Tablet .. 650 every 6 hours PRN  melatonin 3 at bedtime PRN    SOCIAL HISTORY:   Denies alcohol, tobacco, recreational drug use      FAMILY HISTORY:  No pertinent family history in first degree relatives      REVIEW OF SYSTEMS  [  ] ROS unobtainable because:    [x  ] All other systems negative except as noted below:	    Constitutional:  [x ] fever [ x] chills  [ ] weight loss  [ ] weakness  Skin:  [ ] rash [ ] phlebitis	  Eyes: [ ] icterus [ ] pain  [ ] discharge	[x] swelling  ENMT: [ ] sore throat  [ ] thrush [ ] ulcers [ ] exudates  Respiratory: [ ] dyspnea [ ] hemoptysis [ ] cough [ ] sputum	  Cardiovascular:  [ ] chest pain [ ] palpitations [ ] edema	  Gastrointestinal:  [ ] nausea [ ] vomiting [ ] diarrhea [ ] constipation [ ] pain	  Genitourinary:  [ ] dysuria [ ] frequency [ ] hematuria [ ] discharge [ ] flank pain  [ ] incontinence  Musculoskeletal:  [ ] myalgias [ ] arthralgias [ ] arthritis  [ ] back pain  Neurological:  [ ] headache [ ] seizures  [ ] confusion/altered mental status  Psychiatric:  [ ] anxiety [ ] depression	  Hematology/Lymphatics:  [ ] lymphadenopathy  Endocrine:  [ ] adrenal [ ] thyroid  Allergic/Immunologic:	 [ ] transplant [ ] seasonal    Vital Signs Last 24 Hrs  T(F): 101.9 (02-03-24 @ 09:30), Max: 103 (02-02-24 @ 16:49)  Vital Signs Last 24 Hrs  HR: 124 (02-03-24 @ 09:30) (99 - 137)  BP: 126/82 (02-03-24 @ 09:30) (97/60 - 139/84)  RR: 16 (02-03-24 @ 09:30)  SpO2: 100% (02-03-24 @ 09:30) (99% - 100%)  Wt(kg): --    Physical Exam:  Constitutional:  well preserved, comfortable  Head/Eyes: no icterus +L eye swelling with conjunctival injection   ENT:  supple, no cervical lymphadenopathy   LUNGS:  CTA  CVS:  regular rhythm, no murmur  Abd:  soft, non-tender; non-distended  Ext:  no edema  Vascular:  +L chest wall chemoport  MSK:  joints without swelling  Neuro: AAO X 3, non- focal                                6.8    0.34  )-----------( 5        ( 03 Feb 2024 05:35 )             18.9   02-03    135  |  107  |  9   ----------------------------<  97  3.7   |  17<L>  |  0.54    Ca    7.7<L>      03 Feb 2024 05:35    TPro  5.9<L>  /  Alb  2.6<L>  /  TBili  0.9  /  DBili  x   /  AST  12  /  ALT  11  /  AlkPhos  222<H>  02-03    Urinalysis Basic - ( 03 Feb 2024 05:35 )    Color: x / Appearance: x / SG: x / pH: x  Gluc: 97 mg/dL / Ketone: x  / Bili: x / Urobili: x   Blood: x / Protein: x / Nitrite: x   Leuk Esterase: x / RBC: x / WBC x   Sq Epi: x / Non Sq Epi: x / Bacteria: x    MICROBIOLOGY:              RADIOLOGY:  imaging below personally reviewed and agree with findings  < from: Xray Chest 1 View- PORTABLE-Urgent (02.02.24 @ 21:47) >  FINDINGS:  Left chest wall CT compatible power infusion port with tip in SVC region.  Clear lungs. No pleural effusions or pneumothorax.  Cardiac and mediastinal silhouettes within normal limits for projection.  Midline normal caliber trachea.  Unremarkable osseous structures.    IMPRESSION:  Clear lungs.    < end of copied text >  < from: CT Orbit w/ IV Cont (02.02.24 @ 18:39) >  IMPRESSION:    NONCONTRAST HEAD CT: No acute intracranial hemorrhage, mass effect, or   shift of the midline structures.    NONCONTRAST MAXILLOFACIAL CT: Left-sided preseptal cellulitis. No   evidence of orbital cellulitis or abscess.    < end of copied text >   Patient is a 33y old  Female who presents with a chief complaint of neutropenia, anemia, pre-septal cellulitis (03 Feb 2024 00:32)    HPI:  33F with Breast cancer (right breast s/p surgery including axillary node dissection, on chemotherapy last session 1/14/24) presents with redness and swelling of the L eye for the last 1-2 days, found to be febrile 102F, , Pancytopenic, CXR clear, CT showing L preseptal cellulitis without orbital involvement or abscess, ID consulted for assistance.    Manjula  [id number]    Worsening L eye swelling with redness, without pain  Fever and chills has improved since presenting  Denies contact lens use  Denies recent trauma in the eye  Patient reports no recent travel     prior hospital charts reviewed [ x]  primary team notes reviewed [ x ]  other consultant notes reviewed [ x ]    PAST MEDICAL & SURGICAL HISTORY:  Breast cancer      Neutropenia      H/O breast surgery      Breast cancer metastasized to axillary lymph node      Port-A-Cath in place          Allergies  No Known Allergies    ANTIMICROBIALS (past 90 days)  MEDICATIONS  (STANDING):    ampicillin/sulbactam  IVPB   200 mL/Hr IV Intermittent (02-03-24 @ 05:28)    piperacillin/tazobactam IVPB...   200 mL/Hr IV Intermittent (02-02-24 @ 18:01)    vancomycin  IVPB   250 mL/Hr IV Intermittent (02-03-24 @ 09:27)    vancomycin  IVPB.   250 mL/Hr IV Intermittent (02-02-24 @ 18:59)        ampicillin/sulbactam  IVPB    ampicillin/sulbactam  IVPB 3 every 6 hours  nystatin    Suspension [id number] three times a day  vancomycin  IVPB 1000 every 12 hours    MEDICATIONS  (STANDING):  acetaminophen     Tablet .. 650 every 6 hours PRN  melatonin 3 at bedtime PRN    SOCIAL HISTORY:   Denies alcohol, tobacco, recreational drug use      FAMILY HISTORY:  No pertinent family history in first degree relatives      REVIEW OF SYSTEMS  [  ] ROS unobtainable because:    [x  ] All other systems negative except as noted below:	    Constitutional:  [x ] fever [ x] chills  [ ] weight loss  [ ] weakness  Skin:  [ ] rash [ ] phlebitis	  Eyes: [ ] icterus [ ] pain  [ ] discharge	[x] swelling  ENMT: [ ] sore throat  [ ] thrush [ ] ulcers [ ] exudates  Respiratory: [ ] dyspnea [ ] hemoptysis [ ] cough [ ] sputum	  Cardiovascular:  [ ] chest pain [ ] palpitations [ ] edema	  Gastrointestinal:  [ ] nausea [ ] vomiting [ ] diarrhea [ ] constipation [ ] pain	  Genitourinary:  [ ] dysuria [ ] frequency [ ] hematuria [ ] discharge [ ] flank pain  [ ] incontinence  Musculoskeletal:  [ ] myalgias [ ] arthralgias [ ] arthritis  [ ] back pain  Neurological:  [ ] headache [ ] seizures  [ ] confusion/altered mental status  Psychiatric:  [ ] anxiety [ ] depression	  Hematology/Lymphatics:  [ ] lymphadenopathy  Endocrine:  [ ] adrenal [ ] thyroid  Allergic/Immunologic:	 [ ] transplant [ ] seasonal    Vital Signs Last 24 Hrs  T(F): 101.9 (02-03-24 @ 09:30), Max: 103 (02-02-24 @ 16:49)  Vital Signs Last 24 Hrs  HR: 124 (02-03-24 @ 09:30) (99 - 137)  BP: 126/82 (02-03-24 @ 09:30) (97/60 - 139/84)  RR: 16 (02-03-24 @ 09:30)  SpO2: 100% (02-03-24 @ 09:30) (99% - 100%)  Wt(kg): --    Physical Exam:  Constitutional:  well preserved, comfortable  Head/Eyes: no icterus +L eye swelling with conjunctival injection   ENT:  supple, no cervical lymphadenopathy   LUNGS:  CTA  CVS:  regular rhythm, no murmur  Abd:  soft, non-tender; non-distended  Ext:  no edema  Vascular:  +L chest wall chemoport  MSK:  joints without swelling  Neuro: AAO X 3, non- focal                                6.8    0.34  )-----------( 5        ( 03 Feb 2024 05:35 )             18.9   02-03    135  |  107  |  9   ----------------------------<  97  3.7   |  17<L>  |  0.54    Ca    7.7<L>      03 Feb 2024 05:35    TPro  5.9<L>  /  Alb  2.6<L>  /  TBili  0.9  /  DBili  x   /  AST  12  /  ALT  11  /  AlkPhos  222<H>  02-03    Urinalysis Basic - ( 03 Feb 2024 05:35 )    Color: x / Appearance: x / SG: x / pH: x  Gluc: 97 mg/dL / Ketone: x  / Bili: x / Urobili: x   Blood: x / Protein: x / Nitrite: x   Leuk Esterase: x / RBC: x / WBC x   Sq Epi: x / Non Sq Epi: x / Bacteria: x    MICROBIOLOGY:              RADIOLOGY:  imaging below personally reviewed and agree with findings  < from: Xray Chest 1 View- PORTABLE-Urgent (02.02.24 @ 21:47) >  FINDINGS:  Left chest wall CT compatible power infusion port with tip in SVC region.  Clear lungs. No pleural effusions or pneumothorax.  Cardiac and mediastinal silhouettes within normal limits for projection.  Midline normal caliber trachea.  Unremarkable osseous structures.    IMPRESSION:  Clear lungs.    < end of copied text >  < from: CT Orbit w/ IV Cont (02.02.24 @ 18:39) >  IMPRESSION:    NONCONTRAST HEAD CT: No acute intracranial hemorrhage, mass effect, or   shift of the midline structures.    NONCONTRAST MAXILLOFACIAL CT: Left-sided preseptal cellulitis. No   evidence of orbital cellulitis or abscess.    < end of copied text >

## 2024-02-03 NOTE — CONSULT NOTE ADULT - PROBLEM SELECTOR RECOMMENDATION 3
--   -- s/p Udenyca (peg-filgrastim) on 1/25/2024  -- too early to dose additional Growth factor -- need to wait 14 days from prior injection  -- cont monitoring daily

## 2024-02-03 NOTE — H&P ADULT - PROBLEM SELECTOR PLAN 2
s/p surgery and currently undergoing chemotherapy   -I called [facility] office at [phone] to request consult/discuss with oncologist overnight regarding filgrastim considering severe neutropenia. No one responded overnight.   -pain control with tylenol and oxycodone as needed.

## 2024-02-03 NOTE — H&P ADULT - PROBLEM SELECTOR PLAN 1
-CT orbit showed left sided pre-septal cellulitis, no evidence of orbital cellulitis or abscess. [place] [facility] [place] left sided third maxillary molar tooth.   -no pain with EOMI   -will treat broadly with Vanc and Unasyn for now  -MRSA swab ordered   -blood culturex2   -narrow antibiotics accordingly

## 2024-02-03 NOTE — H&P ADULT - NSHPPHYSICALEXAM_GEN_ALL_CORE
Vital Signs Last 24 Hrs  T(C): 36.6 (03 Feb 2024 00:26), Max: 39.4 (02 Feb 2024 16:49)  T(F): 97.9 (03 Feb 2024 00:26), Max: 103 (02 Feb 2024 16:49)  HR: 99 (03 Feb 2024 00:26) (99 - 137)  BP: 101/72 (03 Feb 2024 00:26) (97/60 - 109/67)  BP(mean): --  RR: 20 (03 Feb 2024 00:26) (18 - 23)  SpO2: 100% (03 Feb 2024 00:26) (99% - 100%)    Parameters below as of 03 Feb 2024 00:26  Patient On (Oxygen Delivery Method): room air        CONSTITUTIONAL: Well-groomed, in no apparent distress  EYES: subconjunctival hemorrhage to left eye. non-icteric; PERRLA and symmetric, right eye with erythema, warm, induration, non-tender. EOMI intact without pain to both eyes.   ENMT: No external nasal lesions; nasal mucosa not inflamed; oral mucosa with moist membranes  NECK: Trachea midline without palpable neck mass; thyroid not enlarged and non-tender  RESPIRATORY: Breathing comfortably;  lungs CTA without wheeze/rhonchi/rales  CARDIOVASCULAR: +S1S2, RRR, no M/G/R; no lower extremity edema bilaterally   GASTROINTESTINAL: No palpable masses or tenderness, +BS throughout, no rebound/guarding; no hepatosplenomegaly; no hernia palpated  MUSCULOSKELETAL:  no digital clubbing or cyanosis;  normal strength and tone of extremities  SKIN: No rashes or ulcers noted; no subcutaneous nodules or induration palpable  NEUROLOGIC: CN II-XII intact;  sensation intact in LEs b/l to light touch  PSYCHIATRIC: A+O x 3; mood and affect appropriate; appropriate insight and judgment Vital Signs Last 24 Hrs  T(C): 36.6 (03 Feb 2024 00:26), Max: 39.4 (02 Feb 2024 16:49)  T(F): 97.9 (03 Feb 2024 00:26), Max: 103 (02 Feb 2024 16:49)  HR: 99 (03 Feb 2024 00:26) (99 - 137)  BP: 101/72 (03 Feb 2024 00:26) (97/60 - 109/67)  BP(mean): --  RR: 20 (03 Feb 2024 00:26) (18 - 23)  SpO2: 100% (03 Feb 2024 00:26) (99% - 100%)    Parameters below as of 03 Feb 2024 00:26  Patient On (Oxygen Delivery Method): room air        CONSTITUTIONAL: Well-groomed, in no apparent distress  EYES: subconjunctival hemorrhage to left eye. non-icteric; PERRLA and symmetric, right eye with erythema, warm, induration, non-tender. EOMI intact without pain to both eyes.   ENMT: No external nasal lesions; nasal mucosa not inflamed; oral mucosa with moist membranes  NECK: Trachea midline without palpable neck mass; thyroid not enlarged and non-tender  RESPIRATORY: Breathing comfortably;  lungs CTA without wheeze/rhonchi/rales  CARDIOVASCULAR: +S1S2, RRR, no M/G/R; no lower extremity edema bilaterally   GASTROINTESTINAL: No palpable masses or tenderness, +BS throughout, no rebound/guarding; no hepatosplenomegaly; no hernia palpated  MUSCULOSKELETAL:  no digital clubbing or cyanosis;  normal strength and tone of extremities  SKIN: No rashes or ulcers noted; no subcutaneous nodules or induration palpable  NEUROLOGIC: CN II-XII intact;  sensation intact in LEs b/l to light touch  PSYCHIATRIC: A+O x 3; mood and affect appropriate; appropriate insight and judgment    white patches present to roof of the right upper palate

## 2024-02-03 NOTE — H&P ADULT - NSHPLABSRESULTS_GEN_ALL_CORE
6.5    0.39  )-----------( 9        ( 02 Feb 2024 18:19 )             19.0     129<L>  |  93<L>  |  12  ----------------------------<  101<H>     02-02  3.8   |  20<L>  |  0.72    Ca    8.9      02 Feb 2024 18:19    TPro  7.7  /  Alb  3.5  /  TBili  0.6  /  DBili  x   /  AST  16  /  ALT  16  /  AlkPhos  336<H>  02-02    PT/INR: 13.3/1.18 (02-02-24 @ 18:19)  PTT: 36.9 (02-02-24 @ 18:19)      18:19 - VBG - pH: 7.40  | pCO2: 35    | pO2: 23    | Lactate: 1.0            hs Troponin, T - 10 ng/L (02-02-24 @ 22:40)  hs Troponin, T - 10 ng/L (02-02-24 @ 18:19)

## 2024-02-03 NOTE — H&P ADULT - PROBLEM SELECTOR PLAN 6
-Na 130   -urine Na, urine osm and serum osm ordered  -continue to monitor BMP daily   -will not correct Na more than 6-8 meq in 24 hours

## 2024-02-03 NOTE — CONSULT NOTE ADULT - ASSESSMENT
Ms. Dc is a 33 year old F with history of breast cancer (right breast s/p surgery including axillary node dissection, chemotherapy) who presents with swelling, inflammation  presents with redness and swelling of the right eye for the last 1-2 days.        Problem/Plan - 1:  ·  Problem: Preseptal cellulitis.   ·  Plan: -CT orbit showed left sided pre-septal cellulitis, no evidence of orbital cellulitis or abscess. left mediport with tip at SVC, carious left sided third maxillary molar tooth.   -no pain with EOMI   -will treat broadly with Vanc and Unasyn for now  -MRSA swab ordered   -blood culturex2   -narrow antibiotics accordingly.     Problem/Plan - 2:  ·  Problem: Breast cancer.   ·  Plan: s/p surgery and currently undergoing chemotherapy   -I called [facility] office at [phone] to request consult/discuss with oncologist overnight regarding filgrastim considering severe neutropenia. No one responded overnight.   -pain control with tylenol and oxycodone as needed.     Problem/Plan - 3:  ·  Problem: Neutropenia.   ·  Plan: neutrophil 1%, ANC 0  -gets GCSF monthly injections   -patient likely needs filgrastim   -No one from [facility] responded to call overnight.   -reverse isolation   -continue to monitor CBC daily   -patient at risk for mucor given immunosuppression and severe neutropenia- monitor for changes in sensation/progression of disease.   -Team in AM to call [facility]-number above to give GCSF.     Problem/Plan - 4:  ·  Problem: Thrombocytopenia.   ·  Plan: PLT count 9  -likely in the setting of chemotherapy  -consider IVIG and steroids if drop from baseline.   -obtain baseline PLT count from oncologist.     Problem/Plan - 5:  ·  Problem: Elevated alkaline phosphatase level.   ·  Plan: -Alk phos 336   -GGT in AM  -ABD US ordered to evaluate for GB path.     Problem/Plan - 6:  ·  Problem: Hyponatremia.   ·  Plan: -Na 130   -urine Na, urine osm and serum osm ordered  -continue to monitor BMP daily   -will not correct Na more than 6-8 meq in 24 hours.     Problem/Plan - 7:  ·  Problem: Anemia.   ·  Plan: H/H 6.6/19  -giving 1 unit of PRBC overnight   -CBC ordered stat  -Hb goal>7  -continue to monitor CBC closely  -hemolysis labs ordered.     Problem/Plan - 8:  ·  Problem: Thrush.   ·  Plan: -thrush present in mouth   -Nystatin swish and spit ordered  -no s/s of dysphagia concerning for candidal esophagitis   -continue to monitor.     Problem/Plan - 9:  ·  Problem: Need for prophylactic measure.   ·  Plan: DVT prophylaxis: SCD and OOB   GI prophylaxis: none   Diet: cardiac diet   Full code.

## 2024-02-03 NOTE — H&P ADULT - PROBLEM SELECTOR PLAN 3
neutrophil 1%, ANC 0  -gets GCSF monthly injections   -patient likely needs filgrastim   -No one from [facility] responded to call overnight.   -reverse isolation   -continue to monitor CBC daily   -patient at risk for mucor given immunosuppression and severe neutropenia- monitor for changes in sensation/progression of disease.   -Team in AM to call [facility]-number above to give GCSF

## 2024-02-03 NOTE — H&P ADULT - NSHPREVIEWOFSYSTEMS_GEN_ALL_CORE
Review of Systems:   CONSTITUTIONAL: +fever  EYES: No eye pain, visual disturbances, or discharge, +left eyelid swelling and redness  ENMT:  No difficulty hearing, tinnitus, vertigo; No nasal congestion.   RESPIRATORY: No SOB. No cough, wheezing, chills or hemoptysis  CARDIOVASCULAR: No chest pain, palpitations, dizziness, or leg swelling  GASTROINTESTINAL: No abdominal or epigastric pain. No nausea, vomiting, or hematemesis; No diarrhea or constipation. No melena or hematochezia.  GENITOURINARY: No dysuria, frequency  NEUROLOGICAL: No headaches, numbness, or tremors  SKIN: No itching, burning, rashes, or lesions   ENDOCRINE: No heat or cold intolerance; No hair loss  MUSCULOSKELETAL: No joint pain or swelling; No muscle, back pain  PSYCHIATRIC: No mood swings

## 2024-02-03 NOTE — CHART NOTE - NSCHARTNOTEFT_GEN_A_CORE
2/3 Night Coverage Med ACP    Verified with Blood Bank transfusion reaction was negative thus far and can proceed to transfuse PRBC.    Aubree ALCALAC 2/3 Night Coverage Med ACP    Verified with Blood Bank transfusion reaction was negative thus far and can proceed to transfuse PRBC. However pt still spiking fevers.    Aubree ALCALAC 2/3 Night Coverage Med ACP    Verified with Blood Bank ([place]) transfusion reaction was negative thus far and can proceed to transfuse PRBC. However pt still spiking fevers. Will premedicate with tylenol and benadryl as recommended by Onc.    Aubree ALCALAC

## 2024-02-03 NOTE — CONSULT NOTE ADULT - ASSESSMENT
WORK UP:      ANTIBIOTIC:      IMPRESSION:        SUGGESTIONS:        Above recommendations are Preliminary until Attending's Addendum which includes Final Recommendations      Luis Sanchez DO, PGY-5   ID fellow  [employer] Teams Preferred  After 5pm/weekends call [phone]   33F with Breast cancer (right breast s/p surgery including axillary node dissection, on chemotherapy last session 1/14/24) presents with redness and swelling of the L eye for the last 1-2 days, found to be febrile 102F, , Pancytopenic, CXR clear, CT showing L preseptal cellulitis without orbital involvement or abscess, ID consulted for assistance.    Manjula  [id number]    Worsening L eye swelling with redness, without pain  Fever and chills has improved since presenting  Patient reports no recent travel or contact lens use    IMPRESSION:  #Preseptal Cellulitis  #Neutropenic fever  #Breast Cancer on Chemo    SUGGESTIONS:  - switch Vanco/unasyn to Cefepime 2G q8  - monitor temperature curve  - trend WBC  - follow up BCx, MRSA swab  - Ophtho benjaminal    Case d/w attending and primary team      Luis Sanchez DO, PGY-5   ID fellow  [employer] Teams Preferred  After 5pm/weekends call [phone]   33F with Breast cancer (right breast s/p surgery including axillary node dissection, on chemotherapy last session 1/14/24) presents with redness and swelling of the L eye for the last 1-2 days, found to be febrile 102F, , Pancytopenic, CXR clear, CT showing L preseptal cellulitis without orbital involvement or abscess, ID consulted for assistance.    Manjula  [id number]    Worsening L eye swelling with redness, without pain  Fever and chills has improved since presenting  Denies contact lens use  Denies recent trauma in the eye  Patient reports no recent travel     IMPRESSION:  #Preseptal Cellulitis  #Neutropenic fever  #Breast Cancer on Chemo    SUGGESTIONS:  - switch Vanco/unasyn to Cefepime 2G q8  - monitor temperature curve  - trend WBC  - follow up BCx, MRSA swab  - Ophtho eval    Case d/w attending and primary team      Luis Sanchez DO, PGY-5   ID fellow  [employer] Teams Preferred  After 5pm/weekends call [phone]

## 2024-02-03 NOTE — CONSULT NOTE ADULT - PROBLEM SELECTOR RECOMMENDATION 5
- hb 6.2  - prior to pt receiving last chemo dose, her hemoglobin was around 8  - would transfuse 1 u PRBCs with tylenol, benadryl pre-medication  - f/u cbc tomorrow

## 2024-02-03 NOTE — CHART NOTE - NSCHARTNOTEFT_GEN_A_CORE
Spoke to Dr. Pennington who would like official oncology inpatient consult prior to ordering GCSF.     Spoke to Carolyn Kirkpatrick w/oncology at [facility] who is covering [facility] today.    Chart was reviewed by covering oncologist, will defer administration of Zarxio at present at it appears that patient received long acting GCSF recently.  Labs discussed and reviewed, oncology in agreement w/platelets and PRBC.  Will follow up final recommendations re: GCSF.    patient will be evaluated by [facility] team today, primary team to follow recommendations.     No evidence of active bleeding, hemodynamically stable, patient febrile but vitals otherwise within patient normal parameters.

## 2024-02-03 NOTE — H&P ADULT - NSHPADDITIONALINFOADULT_GEN_ALL_CORE
I called [facility] office at [phone] to request consult/discuss with oncologist overnight regarding filgrastim considering severe neutropenia.

## 2024-02-03 NOTE — CONSULT NOTE ADULT - TIME BILLING
- Review of records, telemetry, vital signs and daily labs.   - General and cardiovascular physical examination.  - Generation of cardiovascular treatment plan.  - Coordination of care.      Patient was seen and examined by me on 1/3/24,interim events noted,labs and radiology studies reviewed.  Jose M Shelley MD,FACC.  [address] [place], [place] [id number].  [phone]

## 2024-02-03 NOTE — H&P ADULT - PROBLEM SELECTOR PLAN 7
H/H 6.6/19  -giving 1 unit of PRBC overnight   -CBC ordered stat  -Hb goal>7  -continue to monitor CBC closely  -hemolysis labs ordered

## 2024-02-04 LAB
HCT VFR BLD CALC: 23.8 % — LOW (ref 34.5–45)
HGB BLD-MCNC: 8.5 G/DL — LOW (ref 11.5–15.5)
MCHC RBC-ENTMCNC: 29.7 PG — SIGNIFICANT CHANGE UP (ref 27–34)
MCHC RBC-ENTMCNC: 35.7 GM/DL — SIGNIFICANT CHANGE UP (ref 32–36)
MCV RBC AUTO: 83.2 FL — SIGNIFICANT CHANGE UP (ref 80–100)
NRBC # BLD: 0 /100 WBCS — SIGNIFICANT CHANGE UP (ref 0–0)
NRBC # FLD: 0 K/UL — SIGNIFICANT CHANGE UP (ref 0–0)
PLATELET # BLD AUTO: 65 K/UL — LOW (ref 150–400)
RBC # BLD: 2.86 M/UL — LOW (ref 3.8–5.2)
RBC # FLD: 16.8 % — HIGH (ref 10.3–14.5)
WBC # BLD: 0.38 K/UL — CRITICAL LOW (ref 3.8–10.5)
WBC # FLD AUTO: 0.38 K/UL — CRITICAL LOW (ref 3.8–10.5)

## 2024-02-04 PROCEDURE — 76705 ECHO EXAM OF ABDOMEN: CPT | Mod: 26

## 2024-02-04 PROCEDURE — 71260 CT THORAX DX C+: CPT | Mod: 26

## 2024-02-04 PROCEDURE — 74177 CT ABD & PELVIS W/CONTRAST: CPT | Mod: 26

## 2024-02-04 PROCEDURE — 99232 SBSQ HOSP IP/OBS MODERATE 35: CPT

## 2024-02-04 RX ORDER — ERYTHROMYCIN BASE 5 MG/GRAM
1 OINTMENT (GRAM) OPHTHALMIC (EYE) THREE TIMES A DAY
Refills: 0 | Status: DISCONTINUED | OUTPATIENT
Start: 2024-02-04 | End: 2024-02-07

## 2024-02-04 RX ORDER — IBUPROFEN 200 MG
600 TABLET ORAL ONCE
Refills: 0 | Status: COMPLETED | OUTPATIENT
Start: 2024-02-04 | End: 2024-02-04

## 2024-02-04 RX ORDER — MUPIROCIN 20 MG/G
1 OINTMENT TOPICAL
Refills: 0 | Status: DISCONTINUED | OUTPATIENT
Start: 2024-02-03 | End: 2024-02-04

## 2024-02-04 RX ORDER — MUPIROCIN 20 MG/G
1 OINTMENT TOPICAL
Refills: 0 | Status: DISCONTINUED | OUTPATIENT
Start: 2024-02-04 | End: 2024-02-07

## 2024-02-04 RX ADMIN — Medication 600 MILLIGRAM(S): at 00:38

## 2024-02-04 RX ADMIN — Medication 500000 UNIT(S): at 22:06

## 2024-02-04 RX ADMIN — CEFEPIME 100 MILLIGRAM(S): 1 INJECTION, POWDER, FOR SOLUTION INTRAMUSCULAR; INTRAVENOUS at 06:23

## 2024-02-04 RX ADMIN — Medication 1 APPLICATION(S): at 22:06

## 2024-02-04 RX ADMIN — Medication 325 MILLIGRAM(S): at 00:35

## 2024-02-04 RX ADMIN — Medication 650 MILLIGRAM(S): at 12:45

## 2024-02-04 RX ADMIN — Medication 1 DROP(S): at 18:43

## 2024-02-04 RX ADMIN — CEFEPIME 100 MILLIGRAM(S): 1 INJECTION, POWDER, FOR SOLUTION INTRAMUSCULAR; INTRAVENOUS at 15:50

## 2024-02-04 RX ADMIN — Medication 500000 UNIT(S): at 15:50

## 2024-02-04 RX ADMIN — Medication 650 MILLIGRAM(S): at 12:14

## 2024-02-04 RX ADMIN — Medication 650 MILLIGRAM(S): at 18:42

## 2024-02-04 RX ADMIN — CEFEPIME 100 MILLIGRAM(S): 1 INJECTION, POWDER, FOR SOLUTION INTRAMUSCULAR; INTRAVENOUS at 22:06

## 2024-02-04 RX ADMIN — MUPIROCIN 1 APPLICATION(S): 20 OINTMENT TOPICAL at 17:52

## 2024-02-04 RX ADMIN — Medication 500000 UNIT(S): at 06:39

## 2024-02-04 RX ADMIN — Medication 600 MILLIGRAM(S): at 01:38

## 2024-02-04 RX ADMIN — Medication 650 MILLIGRAM(S): at 19:15

## 2024-02-04 NOTE — CONSULT NOTE ADULT - ASSESSMENT
Assessment and Recommendations:  33 year old F with history of breast cancer (right breast s/p surgery including axillary node dissection, chemotherapy) presenting with 1 week of worsening left lower eyelid swelling with associated fever and eye redness/tearing, found to have preseptal cellulitis OS.     #Preseptal Cellulitis, Left Side  -Patient presenting with 1 week of worsening left lower eyelid swelling with associated tearing/eye redness and fever. Reports that symptoms started with a   "pimple" below medial lid margin which she scratched/ruptured. Symptoms now improving with IV antibiotics.   -VA 20/20 OS, IOP wnl, PERRLA OU with no rAPD  -No proptosis on exam. EOMs full with no pain/diplopia  -Noted on exam to have +2 indurated lower lid edema/erythema with concave erosion below medial lid margin with overlying scabbing. No drainage or fluctuance. Also with +2 injection and patchy CAROLINE OS  -CT showing left sided preseptal cellulitis. No orbital involvement  -Recommend continued IV antibiotics per primary team  -Recommend erythromycin ointment TID in the left eye   -Ophtho to follow     #Dry Eye, Both Eyes  -Recommend artificial tears QID in both eyes    #Disc Hemorrhage vs Anomalous Vessel, Left Eye  -Found on exam to have possible disc hemorrhage vs anomalous vessel OS. However, disc margins sharp with no edema.   -Possibly in the setting of undiagnosed normal tension glaucoma, as IOP wnl but CDR is large  -No ophthalmic intervention. Will require outpatient follow-up upon discharge     Discussed with Dr. Covington    Outpatient follow-up: Patient should follow-up with his/her ophthalmologist or with [facility] Department of Ophthalmology at the address below     [address] [place] [address]. [address]  [place] [address]  [phone]

## 2024-02-04 NOTE — PROGRESS NOTE ADULT - SUBJECTIVE AND OBJECTIVE BOX
SUBJECTIVE / OVERNIGHT EVENTS:pt seen and examined  02-04-24     MEDICATIONS  (STANDING):  cefepime   IVPB 2000 milliGRAM(s) IV Intermittent every 8 hours  mupirocin 2% Ointment 1 Application(s) Topical two times a day  nystatin    Suspension 124167 Unit(s) Oral three times a day    MEDICATIONS  (PRN):  acetaminophen     Tablet .. 650 milliGRAM(s) Oral every 6 hours PRN Temp greater or equal to 38C (100.4F), Mild Pain (1 - 3)  melatonin 3 milliGRAM(s) Oral at bedtime PRN Insomnia    Vital Signs Last 24 Hrs  T(C): 37 (02-04-24 @ 10:30), Max: 39.1 (02-03-24 @ 15:30)  T(F): 98.6 (02-04-24 @ 10:30), Max: 102.4 (02-03-24 @ 15:30)  HR: 114 (02-04-24 @ 10:30) (92 - 129)  BP: 136/92 (02-04-24 @ 10:30) (107/70 - 136/92)  BP(mean): --  RR: 18 (02-04-24 @ 10:30) (16 - 18)  SpO2: 99% (02-04-24 @ 10:30) (98% - 100%)        Constitutional: No fever, fatigue  Skin: No rash.  Eyes: No recent vision problems or eye pain.  ENT: No congestion, ear pain, or sore throat.  Cardiovascular: No chest pain or palpation.  Respiratory: No cough, shortness of breath, congestion, or wheezing.  Gastrointestinal: No abdominal pain, nausea, vomiting, or diarrhea.  Genitourinary: No dysuria.  Musculoskeletal: No joint swelling.  Neurologic: No headache.    PHYSICAL EXAM:  GENERAL: NAD  EYES: left eye erythema+  NECK: Supple, No JVD  CHEST/LUNG: dec breath sounds at bases  HEART:  S1 , S2 +  ABDOMEN: soft , bs+  EXTREMITIES:  no edema  NEUROLOGY:alert awake      LABS:  02-03    135  |  107  |  9   ----------------------------<  97  3.7   |  17<L>  |  0.54    Ca    7.7<L>      03 Feb 2024 05:35    TPro  5.9<L>  /  Alb  2.6<L>  /  TBili  0.9  /  DBili      /  AST  12  /  ALT  11  /  AlkPhos  222<H>  02-03    Creatinine Trend: 0.54 <--, 0.72 <--                        6.2    0.32  )-----------( 71       ( 03 Feb 2024 17:20 )             17.7     Urine Studies:  Urinalysis Basic - ( 03 Feb 2024 05:35 )    Color:  / Appearance:  / SG:  / pH:   Gluc: 97 mg/dL / Ketone:   / Bili:  / Urobili:    Blood:  / Protein:  / Nitrite:    Leuk Esterase:  / RBC:  / WBC    Sq Epi:  / Non Sq Epi:  / Bacteria:               LIVER FUNCTIONS - ( 03 Feb 2024 05:35 )  Alb: 2.6 g/dL / Pro: 5.9 g/dL / ALK PHOS: 222 U/L / ALT: 11 U/L / AST: 12 U/L / GGT: 213 U/L       PT/INR - ( 03 Feb 2024 05:35 )   PT: 12.6 sec;   INR: 1.13 ratio         PTT - ( 03 Feb 2024 05:35 )  PTT:35.2 sec        RADIOLOGY & ADDITIONAL TESTS:    Imaging Personally Reviewed:    Consultant(s) Notes Reviewed:      Care Discussed with Consultants/Other Providers:

## 2024-02-04 NOTE — PROGRESS NOTE ADULT - SUBJECTIVE AND OBJECTIVE BOX
MR#[id number]  PATIENT NAME:EDUARDO DC    DATE OF SERVICE: 02-04-24  Patient was seen and examined by Jose M Shelley MD on    02-04-24   Interim events noted.Consultant notes ,Labs,Telemetry reviewed by me       HOSPITAL COURSE: HPI:  Ms. Dc is a 33 year old F with history of breast cancer (right breast s/p surgery including axillary node dissection, chemotherapy) who presents with swelling, inflammation  presents with redness and swelling of the right eye for the last 1-2 days. She reports fever (102F), chills, excessive tearing. Denies any pain with extra-occular movement, vision problems, or purulent discharge. She denies any sick contacts. Per patient she has had inflammation of the left eye 1-2x before, but she has never required antibiotics. She recently started a course of antibiotics- augmentin yesterday and had 2 doses. Her last dose of chemotherapy was January 14th and she received an GCSF injection on January 15th. Her last chemotherapy date is scheduled for April 2024. CT orbit showed left sided pre-septal cellulitis, no evidence of orbital cellulitis or abscess. left mediport with tip at SVC, carious left sided third maxillary molar tooth. She also reports daily nausea and vomiting every day since her chemotherapy. EKG as interpreted by me shows the following: , ST, QTc 413 ms, no ST/T changes. She has previous ear infections in the past since she started chemotherapy.     Labs were sig for the following: WBC 0.39, ANC 0.00, decrease PLT count, 1% neutrophil, PT/PTT 36.9, Na 129, CL 93, CO2 20, AG 16, BUN/Cr 12/0.72, Alk phos 336, LA 1.0, Hb/HCT 6.6/19, trop 10->10.       Her oncologist is Dr. Nasir Gondal. She was given a dose of Vanc in the ED and 1 unit of PRBC.  (03 Feb 2024 00:32)      INTERIM EVENTS:Patient seen at bedside ,interim events noted.      PMH -reviewed admission note, no change since admission  HEART FAILURE: Acute[ ]Chronic[ ] Systolic[ ] Diastolic[ ] Combined Systolic and Diastolic[ ]  CAD[ ] CABG[ ] PCI[ ]  DEVICES[ ] PPM[ ] ICD[ ] ILR[ ]  ATRIAL FIBRILLATION[ ] Paroxysmal[ ] Permanent[ ] CHADS2-[  ]  BENTON[ ] CKD1[ ] CKD2[ ] CKD3[ ] CKD4[ ] ESRD[ ]  COPD[ ] HTN[ ]   DM[ ] Type1[ ] Type 2[ ]   CVA[ ] Paresis[ ]    AMBULATION: Assisted[ ] Cane/walker[ ] Independent[ ]    MEDICATIONS  (STANDING):  artificial tears (preservative free) Ophthalmic Solution 1 Drop(s) Both EYES four times a day  cefepime   IVPB 2000 milliGRAM(s) IV Intermittent every 8 hours  erythromycin   Ointment 1 Application(s) Left EYE three times a day  mupirocin 2% Ointment 1 Application(s) Topical two times a day  nystatin    Suspension 627888 Unit(s) Oral three times a day    MEDICATIONS  (PRN):  acetaminophen     Tablet .. 650 milliGRAM(s) Oral every 6 hours PRN Temp greater or equal to 38C (100.4F), Mild Pain (1 - 3)  melatonin 3 milliGRAM(s) Oral at bedtime PRN Insomnia            REVIEW OF SYSTEMS:  Constitutional: [ ] fever, [ ]weight loss,  [ ]fatigue [ ]weight gain  Eyes: [ ] visual changes  Respiratory: [ ]shortness of breath;  [ ] cough, [ ]wheezing, [ ]chills, [ ]hemoptysis  Cardiovascular: [ ] chest pain, [ ]palpitations, [ ]dizziness,  [ ]leg swelling[ ]orthopnea[ ]PND  Gastrointestinal: [ ] abdominal pain, [ ]nausea, [ ]vomiting,  [ ]diarrhea [ ]Constipation [ ]Melena  Genitourinary: [ ] dysuria, [ ] hematuria [ ]Thomas  Neurologic: [ ] headaches [ ] tremors[ ]weakness [ ]Paralysis Right[ ] Left[ ]  Skin: [ ] itching, [ ]burning, [ ] rashes  Endocrine: [ ] heat or cold intolerance  Musculoskeletal: [ ] joint pain or swelling; [ ] muscle, back, or extremity pain  Psychiatric: [ ] depression, [ ]anxiety, [ ]mood swings, or [ ]difficulty sleeping  Hematologic: [ ] easy bruising, [ ] bleeding gums    [ ] All remaining systems negative except as per above.   [ ]Unable to obtain.  [x] No change in ROS since admission      Vital Signs Last 24 Hrs  T(C): 36.8 (04 Feb 2024 18:30), Max: 38.2 (03 Feb 2024 21:11)  T(F): 98.3 (04 Feb 2024 18:30), Max: 100.8 (03 Feb 2024 21:11)  HR: 116 (04 Feb 2024 18:30) (92 - 129)  BP: 137/93 (04 Feb 2024 18:30) (110/71 - 146/99)  BP(mean): --  RR: 18 (04 Feb 2024 18:30) (16 - 18)  SpO2: 100% (04 Feb 2024 18:30) (99% - 100%)    Parameters below as of 04 Feb 2024 18:30  Patient On (Oxygen Delivery Method): room air      I&O's Summary    03 Feb 2024 07:01  -  04 Feb 2024 07:00  --------------------------------------------------------  IN: 300 mL / OUT: 400 mL / NET: -100 mL    04 Feb 2024 07:01  -  04 Feb 2024 21:04  --------------------------------------------------------  IN: 900 mL / OUT: 0 mL / NET: 900 mL        PHYSICAL EXAM:  General: No acute distress BMI-  HEENT: EOMI, PERRL  Neck: Supple, [ ] JVD  Lungs: Equal air entry bilaterally; [ ] rales [ ] wheezing [ ] rhonchi  Heart: Regular rate and rhythm; [x ] murmur   2/6 [ x] systolic [ ] diastolic [ ] radiation[ ] rubs [ ]  gallops  Abdomen: Nontender, bowel sounds present  Extremities: No clubbing, cyanosis, [ ] edema [ ]Pulses  equal and intact  Nervous system:  Alert & Oriented X3, no focal deficits  Psychiatric: Normal affect  Skin: No rashes or lesions    LABS:  02-03    135  |  107  |  9   ----------------------------<  97  3.7   |  17<L>  |  0.54    Ca    7.7<L>      03 Feb 2024 05:35    TPro  5.9<L>  /  Alb  2.6<L>  /  TBili  0.9  /  DBili  x   /  AST  12  /  ALT  11  /  AlkPhos  222<H>  02-03    Creatinine Trend: 0.54<--, 0.72<--                        8.5    0.38  )-----------( 65       ( 04 Feb 2024 13:00 )             23.8     PT/INR - ( 03 Feb 2024 05:35 )   PT: 12.6 sec;   INR: 1.13 ratio         PTT - ( 03 Feb 2024 05:35 )  PTT:35.2 sec

## 2024-02-04 NOTE — PHARMACOTHERAPY INTERVENTION NOTE - COMMENTS
Medication history update: Patient currently on chemotherapy regimen Cytoxan and Adriamycin per oncologist Dr. Nasir Gondal [phone] (person cell).  [facility] office [phone] or [phone] (office closed on weekends).  Please call spectra [phone] if you have any questions.  Medication history update: Patient currently on chemotherapy regimen Cytoxan and Adriamycin per oncologist Dr. Nasir Gondal [phone] (personal cell).  [facility] office [phone] or [phone] (office closed on weekends).  Please call spectra [phone] if you have any questions.

## 2024-02-04 NOTE — CONSULT NOTE ADULT - SUBJECTIVE AND OBJECTIVE BOX
[facility] DEPARTMENT OF OPHTHALMOLOGY - INITIAL ADULT CONSULT  ----------------------------------------------------------------------------------------------------  Chanelle Ferreira MD, PGY-2  -------------------------------------------------------------------------------------------------    HPI:  Ms. Dc is a 33 year old F with history of breast cancer (right breast s/p surgery including axillary node dissection, chemotherapy) who presents with swelling, inflammation  presents with redness and swelling of the right eye for the last 1-2 days. She reports fever (102F), chills, excessive tearing. Denies any pain with extra-occular movement, vision problems, or purulent discharge. She denies any sick contacts. Per patient she has had inflammation of the left eye 1-2x before, but she has never required antibiotics. She recently started a course of antibiotics- augmentin yesterday and had 2 doses. Her last dose of chemotherapy was January 14th and she received an GCSF injection on January 15th. Her last chemotherapy date is scheduled for April 2024. CT orbit showed left sided pre-septal cellulitis, no evidence of orbital cellulitis or abscess. left mediport with tip at SV, carious left sided third maxillary molar tooth. She also reports daily nausea and vomiting every day since her chemotherapy. EKG as interpreted by me shows the following: , ST, QTc 413 ms, no ST/T changes. She has previous ear infections in the past since she started chemotherapy.     Labs were sig for the following: WBC 0.39, ANC 0.00, decrease PLT count, 1% neutrophil, PT/PTT 36.9, Na 129, CL 93, CO2 20, AG 16, BUN/Cr 12/0.72, Alk phos 336, LA 1.0, Hb/HCT 6.6/19, trop 10->10.       Her oncologist is Dr. Nasir Gondal. She was given a dose of Vanc in the ED and 1 unit of PRBC.  (03 Feb 2024 00:32)    Interval History: Patient reports that 1 week ago, she developed a "pimple" of the left lower eyelid. Reports that she was picking at it and scratching it, causing it to subsequently burst. In the following days, the lid became very swollen and red. also reports associated fever. Denies any pain with movement of the eyes, blurry vision, diplopia and photophobia. States that eye became more red as lid swelling worsened. Has history of similar bump developing, but it normally self-resolves. Also reports improvement with antibiotics while in hospital.     PAST MEDICAL & SURGICAL HISTORY:  Breast cancer      Neutropenia      H/O breast surgery      Breast cancer metastasized to axillary lymph node      Port-A-Cath in place        Past Ocular History: None  Ophthalmic Medications: None  FAMILY HISTORY: No glaucoma or ARMD  No pertinent family history in first degree relatives        MEDICATIONS  (STANDING):  cefepime   IVPB 2000 milliGRAM(s) IV Intermittent every 8 hours  mupirocin 2% Ointment 1 Application(s) Topical two times a day  nystatin    Suspension 089342 Unit(s) Oral three times a day    MEDICATIONS  (PRN):  acetaminophen     Tablet .. 650 milliGRAM(s) Oral every 6 hours PRN Temp greater or equal to 38C (100.4F), Mild Pain (1 - 3)  melatonin 3 milliGRAM(s) Oral at bedtime PRN Insomnia    Allergies & Intolerances:   No Known Allergies    Review of Systems:  Constitutional: No fever, chills  Eyes: +erythema, tearing. No blurry vision, flashes, floaters, FBS, discharge, double vision, OU  Neuro: No tremors  Cardiovascular: No chest pain, palpitations  Respiratory: No SOB, no cough  GI: No nausea, vomiting, abdominal pain  : No dysuria  Skin: no rash  Psych: no depression  Endocrine: no polyuria, polydipsia  Heme/lymph: no swelling    VITALS: T(C): 36.8 (02-04-24 @ 14:30)  T(F): 98.3 (02-04-24 @ 14:30), Max: 100.8 (02-03-24 @ 21:11)  HR: 102 (02-04-24 @ 14:30) (92 - 129)  BP: 146/99 (02-04-24 @ 14:30) (110/71 - 146/99)  RR:  (16 - 18)  SpO2:  (99% - 100%)  Wt(kg): --  General: AAO x 3, appropriate mood and affect    Ophthalmology Exam:  Visual acuity (sc): 20/20 OD, 20/20 OS  Pupils: PERRL OU, no APD  Ttono: 18 OD, 14 OS  Extraocular movements (EOMs): Full OU, no pain, no diplopia  Confrontational Visual Field (CVF): Full OU  Color Plates: 12/12 OD, 11/12 OS    Pen Light Exam (PLE)  External: Flat OU  Lids/Lashes/Lacrimal Ducts: +1-2 lower lid edema/erythema with associated erythema OS, no fluctuance. Indurated on palpation throughout lid. Concave erosion noted below lid margin medially with overlying scabbing. No purulence expressed. LL punctum edematous but with no discharge OS. Papillary rxn OS.   Sclera/Conjunctiva: W+Q OD. +2 injection with patchy CAROLINE OS and mild chemosis   Cornea: +2 PEE OU  Anterior Chamber: D+F OU    Iris: Flat OU  Lens: Cl OU    Fundus Exam: dilated with 1% tropicamide and 2.5% phenylephrine  Approval obtained from primary team for dilation  Patient aware that pupils can remained dilated for at least 4-6 hours  Exam performed with 20D lens    Vitreous: wnl OU  Disc, cup/disc: sharp and pink, 0.7 OD and 0.6 OS with nasal disc hemorrhage vs anomalous vessel  Macula: wnl OU  Vessels: wnl OU  Periphery: wnl OU    Imaging:  < from: CT Orbit w/ IV Cont (02.02.24 @ 18:39) >  IMPRESSION:    NONCONTRAST HEAD CT: No acute intracranial hemorrhage, mass effect, or   shift of the midline structures.    NONCONTRAST MAXILLOFACIAL CT: Left-sided preseptal cellulitis. No   evidence of orbital cellulitis or abscess.      < end of copied text >     [facility] DEPARTMENT OF OPHTHALMOLOGY - INITIAL ADULT CONSULT  ----------------------------------------------------------------------------------------------------  Chanelle Ferreira MD, PGY-2  -------------------------------------------------------------------------------------------------    HPI:  Ms. Dc is a 33 year old F with history of breast cancer (right breast s/p surgery including axillary node dissection, chemotherapy) who presents with swelling, inflammation  presents with redness and swelling of the right eye for the last 1-2 days. She reports fever (102F), chills, excessive tearing. Denies any pain with extra-occular movement, vision problems, or purulent discharge. She denies any sick contacts. Per patient she has had inflammation of the left eye 1-2x before, but she has never required antibiotics. She recently started a course of antibiotics- augmentin yesterday and had 2 doses. Her last dose of chemotherapy was January 14th and she received an GCSF injection on January 15th. Her last chemotherapy date is scheduled for April 2024. CT orbit showed left sided pre-septal cellulitis, no evidence of orbital cellulitis or abscess. left mediport with tip at SV, carious left sided third maxillary molar tooth. She also reports daily nausea and vomiting every day since her chemotherapy. EKG as interpreted by me shows the following: , ST, QTc 413 ms, no ST/T changes. She has previous ear infections in the past since she started chemotherapy.     Labs were sig for the following: WBC 0.39, ANC 0.00, decrease PLT count, 1% neutrophil, PT/PTT 36.9, Na 129, CL 93, CO2 20, AG 16, BUN/Cr 12/0.72, Alk phos 336, LA 1.0, Hb/HCT 6.6/19, trop 10->10.       Her oncologist is Dr. Nasir Gondal. She was given a dose of Vanc in the ED and 1 unit of PRBC.  (03 Feb 2024 00:32)    Interval History: Patient reports that 1 week ago, she developed a "pimple" of the left lower eyelid. Reports that she was picking at it and scratching it, causing it to subsequently burst. In the following days, the lid became very swollen and red. also reports associated fever. Denies any pain with movement of the eyes, blurry vision, diplopia and photophobia. States that eye became more red as lid swelling worsened. Has history of similar bump developing, but it normally self-resolves. Also reports improvement with antibiotics while in hospital.     PAST MEDICAL & SURGICAL HISTORY:  Breast cancer      Neutropenia      H/O breast surgery      Breast cancer metastasized to axillary lymph node      Port-A-Cath in place        Past Ocular History: None  Ophthalmic Medications: None  FAMILY HISTORY: No glaucoma or ARMD  No pertinent family history in first degree relatives        MEDICATIONS  (STANDING):  cefepime   IVPB 2000 milliGRAM(s) IV Intermittent every 8 hours  mupirocin 2% Ointment 1 Application(s) Topical two times a day  nystatin    Suspension 628923 Unit(s) Oral three times a day    MEDICATIONS  (PRN):  acetaminophen     Tablet .. 650 milliGRAM(s) Oral every 6 hours PRN Temp greater or equal to 38C (100.4F), Mild Pain (1 - 3)  melatonin 3 milliGRAM(s) Oral at bedtime PRN Insomnia    Allergies & Intolerances:   No Known Allergies    Review of Systems:  Constitutional: No fever, chills  Eyes: +erythema, tearing. No blurry vision, flashes, floaters, FBS, discharge, double vision, OU  Neuro: No tremors  Cardiovascular: No chest pain, palpitations  Respiratory: No SOB, no cough  GI: No nausea, vomiting, abdominal pain  : No dysuria  Skin: no rash  Psych: no depression  Endocrine: no polyuria, polydipsia  Heme/lymph: no swelling    VITALS: T(C): 36.8 (02-04-24 @ 14:30)  T(F): 98.3 (02-04-24 @ 14:30), Max: 100.8 (02-03-24 @ 21:11)  HR: 102 (02-04-24 @ 14:30) (92 - 129)  BP: 146/99 (02-04-24 @ 14:30) (110/71 - 146/99)  RR:  (16 - 18)  SpO2:  (99% - 100%)  Wt(kg): --  General: AAO x 3, appropriate mood and affect    Ophthalmology Exam:  Visual acuity (sc): 20/20 OD, 20/20 OS  Pupils: PERRL OU, no APD  Ttono: 18 OD, 14 OS  Extraocular movements (EOMs): Full OU, no pain, no diplopia  Confrontational Visual Field (CVF): Full OU  Color Plates: 12/12 OD, 11/12 OS    Pen Light Exam (PLE)  External: Flat OU  Lids/Lashes/Lacrimal Ducts: +1-2 lower lid edema/erythema with associated erythema OS, no fluctuance. Indurated on palpation throughout lid. Concave erosion noted below lid margin medially with overlying scabbing. No purulence expressed. LL punctum edematous but with no discharge OS. Papillary rxn OS.   Sclera/Conjunctiva: W+Q OD. +2 injection with patchy CAROLINE OS and mild chemosis   Cornea: +2 PEE OU  Anterior Chamber: D+F OU    Iris: Flat OU  Lens: Cl OU    Fundus Exam: dilated with 1% tropicamide and 2.5% phenylephrine  Approval obtained from primary team for dilation  Patient aware that pupils can remained dilated for at least 4-6 hours  Exam performed with 20D lens  Consulted placed by Maria Luisa Merino at 2 PM with consent obtained to dilate     Vitreous: wnl OU  Disc, cup/disc: sharp and pink, 0.7 OD and 0.6 OS with nasal disc hemorrhage vs anomalous vessel  Macula: wnl OU  Vessels: wnl OU  Periphery: wnl OU    Imaging:  < from: CT Orbit w/ IV Cont (02.02.24 @ 18:39) >  IMPRESSION:    NONCONTRAST HEAD CT: No acute intracranial hemorrhage, mass effect, or   shift of the midline structures.    NONCONTRAST MAXILLOFACIAL CT: Left-sided preseptal cellulitis. No   evidence of orbital cellulitis or abscess.      < end of copied text >

## 2024-02-04 NOTE — PROGRESS NOTE ADULT - SUBJECTIVE AND OBJECTIVE BOX
Patient is a 33y old  Female who presents with a chief complaint of neutropenia, anemia, pre-septal cellulitis (04 Feb 2024 11:14)       Pt is seen and examined  pt is awake and lying in bed/out of bed to chair  pt seems comfortable and denies any complaints at this time      REVIEW OF SYSTEMS:    CONSTITUTIONAL: No weakness, fevers or chills  EYES/ENT: No visual changes;  No vertigo or throat pain   NECK: No pain or stiffness  RESPIRATORY: No cough, wheezing, hemoptysis; No shortness of breath  CARDIOVASCULAR: No chest pain or palpitations  GASTROINTESTINAL: No abdominal or epigastric pain. No nausea, vomiting, or hematemesis; No diarrhea or constipation. No melena or hematochezia.  GENITOURINARY: No dysuria, frequency or hematuria  NEUROLOGICAL: No numbness or weakness  SKIN: No itching, burning, rashes, or lesions     MEDICATIONS  (STANDING):  cefepime   IVPB 2000 milliGRAM(s) IV Intermittent every 8 hours  mupirocin 2% Ointment 1 Application(s) Topical two times a day  nystatin    Suspension 600118 Unit(s) Oral three times a day    MEDICATIONS  (PRN):  acetaminophen     Tablet .. 650 milliGRAM(s) Oral every 6 hours PRN Temp greater or equal to 38C (100.4F), Mild Pain (1 - 3)  melatonin 3 milliGRAM(s) Oral at bedtime PRN Insomnia      Allergies    No Known Allergies    Intolerances        Vital Signs Last 24 Hrs  T(C): 37 (04 Feb 2024 10:30), Max: 39.1 (03 Feb 2024 15:30)  T(F): 98.6 (04 Feb 2024 10:30), Max: 102.4 (03 Feb 2024 15:30)  HR: 114 (04 Feb 2024 10:30) (92 - 129)  BP: 136/92 (04 Feb 2024 10:30) (107/70 - 136/92)  BP(mean): --  RR: 18 (04 Feb 2024 10:30) (16 - 18)  SpO2: 99% (04 Feb 2024 10:30) (98% - 100%)    Parameters below as of 04 Feb 2024 10:30  Patient On (Oxygen Delivery Method): room air        PHYSICAL EXAM  General: adult in NAD  HEENT: clear oropharynx, anicteric sclera, pink conjunctiva  Neck: supple  CV: normal S1/S2 with no murmur rubs or gallops  Lungs: positive air movement b/l ant lungs,clear to auscultation, no wheezes, no rales  Abdomen: soft non-tender non-distended, no hepatosplenomegaly  Ext: no clubbing cyanosis or edema  Skin: no rashes and no petechiae  Neuro: alert and oriented X 4, no focal deficits      LABS:    CBC today pending collection                          6.2    0.32  )-----------( 71       ( 03 Feb 2024 17:20 )             17.7         Mean Cell Volume : 89.4 fL  Mean Cell Hemoglobin : 31.3 pg  Mean Cell Hemoglobin Concentration : 35.0 gm/dL  Auto Neutrophil # : 0.02 K/uL  Auto Lymphocyte # : 0.27 K/uL  Auto Monocyte # : 0.02 K/uL  Auto Eosinophil # : 0.00 K/uL  Auto Basophil # : 0.00 K/uL  Auto Neutrophil % : 7.5 %  Auto Lymphocyte % : 85.0 %  Auto Monocyte % : 5.0 %  Auto Eosinophil % : 0.0 %  Auto Basophil % : 0.0 %    Serial CBC's  02-03 @ 17:20  Hct-17.7 / Hgb-6.2 / Plat-71 / RBC-1.98 / WBC-0.32          Serial CBC's  02-03 @ 05:35  Hct-18.9 / Hgb-6.8 / Plat-5 / RBC-2.14 / WBC-0.34          Serial CBC's  02-02 @ 18:19  Hct-19.0 / Hgb-6.5 / Plat-9 / RBC-2.07 / WBC-0.39            02-03    135  |  107  |  9   ----------------------------<  97  3.7   |  17<L>  |  0.54    Ca    7.7<L>      03 Feb 2024 05:35    TPro  5.9<L>  /  Alb  2.6<L>  /  TBili  0.9  /  DBili  x   /  AST  12  /  ALT  11  /  AlkPhos  222<H>  02-03      PT/INR - ( 03 Feb 2024 05:35 )   PT: 12.6 sec;   INR: 1.13 ratio         PTT - ( 03 Feb 2024 05:35 )  PTT:35.2 sec    WBC Count: 0.32 K/uL (02-03 @ 17:20)  Hemoglobin: 6.2 g/dL (02-03 @ 17:20)  Hematocrit: 17.7 % (02-03 @ 17:20)  Platelet Count - Automated: 71 K/uL (02-03 @ 17:20)  WBC Count: 0.34 K/uL (02-03 @ 05:35)  Hemoglobin: 6.8 g/dL (02-03 @ 05:35)  Hematocrit: 18.9 % (02-03 @ 05:35)  Platelet Count - Automated: 5 K/uL (02-03 @ 05:35)  WBC Count: 0.29 K/uL (02-03 @ 05:35)  Hemoglobin: 6.8 g/dL (02-03 @ 05:35)  Hematocrit: 19.5 % (02-03 @ 05:35)  Platelet Count - Automated: 5 K/uL (02-03 @ 05:35)  Iron - Total Binding Capacity.: 144 ug/dL (02-03 @ 05:35)  Ferritin: 409 ng/mL (02-03 @ 05:35)  Haptoglobin, Serum: 276 mg/dL (02-03 @ 05:35)  Lactate Dehydrogenase, Serum: 123 U/L (02-03 @ 05:35)  Reticulocyte Percent: 0.4 % (02-03 @ 05:35)  WBC Count: 0.39 K/uL (02-02 @ 18:19)  Hemoglobin: 6.5 g/dL (02-02 @ 18:19)  Hematocrit: 19.0 % (02-02 @ 18:19)  Platelet Count - Automated: 9 K/uL (02-02 @ 18:19)                          BLOOD SMEAR INTERPRETATION:       RADIOLOGY & ADDITIONAL STUDIES:    Assessment           Opt out Patient is a 33y old  Female who presents with a chief complaint of neutropenia, anemia, pre-septal cellulitis (04 Feb 2024 11:14)    [facility]  #[id number]       Pt is seen and examined  pt is awake and sitting in bed  pt seems comfortable and denies any complaints at this time; eye pain unchanged, swelling unchanged; having pain in mouth from thrush      REVIEW OF SYSTEMS:    CONSTITUTIONAL: (+) weakness, fevers last over 12 hr ago, no chills  EYES/ENT: No visual changes;  No vertigo or throat pain   NECK: No pain or stiffness  RESPIRATORY: No cough, wheezing, hemoptysis; No shortness of breath  CARDIOVASCULAR: No chest pain or palpitations  GASTROINTESTINAL: No abdominal or epigastric pain. No nausea, vomiting, or hematemesis; No diarrhea or constipation. No melena or hematochezia.  GENITOURINARY: No dysuria, frequency or hematuria  NEUROLOGICAL: No numbness or weakness  SKIN: No itching, burning, rashes, or lesions     MEDICATIONS  (STANDING):  cefepime   IVPB 2000 milliGRAM(s) IV Intermittent every 8 hours  mupirocin 2% Ointment 1 Application(s) Topical two times a day  nystatin    Suspension 001751 Unit(s) Oral three times a day    MEDICATIONS  (PRN):  acetaminophen     Tablet .. 650 milliGRAM(s) Oral every 6 hours PRN Temp greater or equal to 38C (100.4F), Mild Pain (1 - 3)  melatonin 3 milliGRAM(s) Oral at bedtime PRN Insomnia      Allergies    No Known Allergies    Intolerances        Vital Signs Last 24 Hrs  T(C): 37 (04 Feb 2024 10:30), Max: 39.1 (03 Feb 2024 15:30)  T(F): 98.6 (04 Feb 2024 10:30), Max: 102.4 (03 Feb 2024 15:30)  HR: 114 (04 Feb 2024 10:30) (92 - 129)  BP: 136/92 (04 Feb 2024 10:30) (107/70 - 136/92)  BP(mean): --  RR: 18 (04 Feb 2024 10:30) (16 - 18)  SpO2: 99% (04 Feb 2024 10:30) (98% - 100%)    Parameters below as of 04 Feb 2024 10:30  Patient On (Oxygen Delivery Method): room air        PHYSICAL EXAM  General: adult in NAD  HEENT: clear oropharynx,eye erythema and swelling, no d/c; conjunctiva injected  Mouth (+) Thrush  Neck: supple  CV: normal S1/S2 with no murmur rubs or gallops  Lungs: positive air movement b/l ant lungs,clear to auscultation, no wheezes, no rales  Abdomen: soft non-tender non-distended, no hepatosplenomegaly  Ext: no clubbing cyanosis or edema  Skin: no rashes and no petechiae  Neuro: alert and oriented X 4, no focal deficits      LABS:    CBC today pending collection                          6.2    0.32  )-----------( 71       ( 03 Feb 2024 17:20 )             17.7         Mean Cell Volume : 89.4 fL  Mean Cell Hemoglobin : 31.3 pg  Mean Cell Hemoglobin Concentration : 35.0 gm/dL  Auto Neutrophil # : 0.02 K/uL  Auto Lymphocyte # : 0.27 K/uL  Auto Monocyte # : 0.02 K/uL  Auto Eosinophil # : 0.00 K/uL  Auto Basophil # : 0.00 K/uL  Auto Neutrophil % : 7.5 %  Auto Lymphocyte % : 85.0 %  Auto Monocyte % : 5.0 %  Auto Eosinophil % : 0.0 %  Auto Basophil % : 0.0 %    Serial CBC's  02-03 @ 17:20  Hct-17.7 / Hgb-6.2 / Plat-71 / RBC-1.98 / WBC-0.32          Serial CBC's  02-03 @ 05:35  Hct-18.9 / Hgb-6.8 / Plat-5 / RBC-2.14 / WBC-0.34          Serial CBC's  02-02 @ 18:19  Hct-19.0 / Hgb-6.5 / Plat-9 / RBC-2.07 / WBC-0.39            02-03    135  |  107  |  9   ----------------------------<  97  3.7   |  17<L>  |  0.54    Ca    7.7<L>      03 Feb 2024 05:35    TPro  5.9<L>  /  Alb  2.6<L>  /  TBili  0.9  /  DBili  x   /  AST  12  /  ALT  11  /  AlkPhos  222<H>  02-03      PT/INR - ( 03 Feb 2024 05:35 )   PT: 12.6 sec;   INR: 1.13 ratio         PTT - ( 03 Feb 2024 05:35 )  PTT:35.2 sec    WBC Count: 0.32 K/uL (02-03 @ 17:20)  Hemoglobin: 6.2 g/dL (02-03 @ 17:20)  Hematocrit: 17.7 % (02-03 @ 17:20)  Platelet Count - Automated: 71 K/uL (02-03 @ 17:20)  WBC Count: 0.34 K/uL (02-03 @ 05:35)  Hemoglobin: 6.8 g/dL (02-03 @ 05:35)  Hematocrit: 18.9 % (02-03 @ 05:35)  Platelet Count - Automated: 5 K/uL (02-03 @ 05:35)  WBC Count: 0.29 K/uL (02-03 @ 05:35)  Hemoglobin: 6.8 g/dL (02-03 @ 05:35)  Hematocrit: 19.5 % (02-03 @ 05:35)  Platelet Count - Automated: 5 K/uL (02-03 @ 05:35)  Iron - Total Binding Capacity.: 144 ug/dL (02-03 @ 05:35)  Ferritin: 409 ng/mL (02-03 @ 05:35)  Haptoglobin, Serum: 276 mg/dL (02-03 @ 05:35)  Lactate Dehydrogenase, Serum: 123 U/L (02-03 @ 05:35)  Reticulocyte Percent: 0.4 % (02-03 @ 05:35)  WBC Count: 0.39 K/uL (02-02 @ 18:19)  Hemoglobin: 6.5 g/dL (02-02 @ 18:19)  Hematocrit: 19.0 % (02-02 @ 18:19)  Platelet Count - Automated: 9 K/uL (02-02 @ 18:19)                          BLOOD SMEAR INTERPRETATION:       RADIOLOGY & ADDITIONAL STUDIES:    Assessment

## 2024-02-04 NOTE — PROGRESS NOTE ADULT - ASSESSMENT
Ms. Dc is a 33 year old F with history of breast cancer (right breast s/p surgery including axillary node dissection, chemotherapy) who presents with swelling, inflammation  presents with redness and swelling of the right eye for the last 1-2 days.        Problem/Plan - 1:  ·  Problem: Preseptal cellulitis.   ·  Plan: -CT orbit showed left sided pre-septal cellulitis, no evidence of orbital cellulitis or abscess. left mediport with tip at SVC, carious left sided third maxillary molar tooth.   -no pain with EOMI   -will treat broadly with Vanc and Unasyn for now  -MRSA swab ordered   -blood culturex2   -narrow antibiotics accordingly.     Problem/Plan - 2:  ·  Problem: Breast cancer.   ·  Plan: s/p surgery and currently undergoing chemotherapy   -I called [facility] office at [phone] to request consult/discuss with oncologist overnight regarding filgrastim considering severe neutropenia. No one responded overnight.   -pain control with tylenol and oxycodone as needed.     Problem/Plan - 3:  ·  Problem: Neutropenia.   ·  Plan: neutrophil 1%, ANC 0  -gets GCSF monthly injections   -patient likely needs filgrastim   -No one from [facility] responded to call overnight.   -reverse isolation   -continue to monitor CBC daily   -patient at risk for mucor given immunosuppression and severe neutropenia- monitor for changes in sensation/progression of disease.   -Team in AM to call [facility]-number above to give GCSF.     Problem/Plan - 4:  ·  Problem: Thrombocytopenia.   ·  Plan: PLT count 9  -likely in the setting of chemotherapy  -consider IVIG and steroids if drop from baseline.   -obtain baseline PLT count from oncologist.     Problem/Plan - 5:  ·  Problem: Elevated alkaline phosphatase level.   ·  Plan: -Alk phos 336   -GGT in AM  -ABD US ordered to evaluate for GB path.     Problem/Plan - 6:  ·  Problem: Hyponatremia.   ·  Plan: -Na 130   -urine Na, urine osm and serum osm ordered  -continue to monitor BMP daily   -will not correct Na more than 6-8 meq in 24 hours.     Problem/Plan - 7:  ·  Problem: Anemia.   ·  Plan: H/H 6.6/19  -giving 1 unit of PRBC overnight   -CBC ordered stat  -Hb goal>7  -continue to monitor CBC closely  -hemolysis labs ordered.     Problem/Plan - 8:  ·  Problem: Thrush.   ·  Plan: -thrush present in mouth   -Nystatin swish and spit ordered  -no s/s of dysphagia concerning for candidal esophagitis   -continue to monitor.     Problem/Plan - 9:  ·  Problem: Need for prophylactic measure.   ·  Plan: DVT prophylaxis: SCD and OOB   GI prophylaxis: none   Diet: cardiac diet   Full code.

## 2024-02-04 NOTE — PROGRESS NOTE ADULT - ASSESSMENT
33 F with Breast cancer (right breast s/p surgery including axillary node dissection, on chemotherapy last session 1/14/24) presents with redness and swelling of the L eye  Fever, neutropenia  CXR clear  CT L preseptal cellulitis without orbital involvement  RVP neg  L sided mediport  Preseptal cellulitis  MRSA positive  Overall, Neutropenic fever, preseptal cellulitis  - Cefepime 2g q 8  - Low threshold to add vanco if worsening  - Monitor site for improvement  - Trend ANC/WBC  - F/U BCXs  - F/U MRSA PCR  - Would obtain Ophtho eval--presentation seems somewhat atypical for a normal preseptal cellulitis (minimal pain)    Valentino Sofia MD  Contact on TEAMS messaging from 9am - 5pm  From 5pm-9am, on weekends, or if no response call [phone]

## 2024-02-04 NOTE — CONSULT NOTE ADULT - REASON FOR ADMISSION
neutropenia, anemia, pre-septal cellulitis

## 2024-02-04 NOTE — PROGRESS NOTE ADULT - SUBJECTIVE AND OBJECTIVE BOX
CC: F/U for Preseptal cellulitis    Saw/spoke to patient. No fevers, no chills. No new complaints.    Allergies  No Known Allergies    ANTIMICROBIALS:  cefepime   IVPB 2000 every 8 hours  nystatin    Suspension 454108 three times a day    PE:    Vital Signs Last 24 Hrs  T(C): 36.8 (04 Feb 2024 06:30), Max: 39.1 (03 Feb 2024 15:30)  T(F): 98.2 (04 Feb 2024 06:30), Max: 102.4 (03 Feb 2024 15:30)  HR: 98 (04 Feb 2024 06:30) (92 - 129)  BP: 123/71 (04 Feb 2024 06:30) (100/62 - 134/90)  RR: 18 (04 Feb 2024 06:30) (16 - 18)  SpO2: 100% (04 Feb 2024 06:30) (98% - 100%)    Gen: AOx3, NAD, non-toxic  CV: Nontachycardic  Resp: Breathing comfortably, RA  Abd: Soft, nontender  IV/Skin: No thrombophlebitis    LABS:                        6.2    0.32  )-----------( 71       ( 03 Feb 2024 17:20 )             17.7     02-03    135  |  107  |  9   ----------------------------<  97  3.7   |  17<L>  |  0.54    Ca    7.7<L>      03 Feb 2024 05:35    TPro  5.9<L>  /  Alb  2.6<L>  /  TBili  0.9  /  DBili  x   /  AST  12  /  ALT  11  /  AlkPhos  222<H>  02-03    Urinalysis Basic - ( 03 Feb 2024 05:35 )    Color: x / Appearance: x / SG: x / pH: x  Gluc: 97 mg/dL / Ketone: x  / Bili: x / Urobili: x   Blood: x / Protein: x / Nitrite: x   Leuk Esterase: x / RBC: x / WBC x   Sq Epi: x / Non Sq Epi: x / Bacteria: x    MICROBIOLOGY:    Clean Catch Clean Catch (Midstream)  02-02-24   <10,000 CFU/mL Normal Urogenital Kaylee  --  --    .Blood Blood-Peripheral  02-02-24   No growth at 24 hours  --  --    .Blood Blood-Peripheral  02-02-24   No growth at 24 hours  --  --    Rapid RVP Result: NotDetec (02-03 @ 10:20)    RADIOLOGY:    2/2    FINDINGS:  Left chest wall CT compatible power infusion port with tip in SVC region.  Clear lungs. No pleural effusions or pneumothorax.  Cardiac and mediastinal silhouettes within normal limits for projection.  Midline normal caliber trachea.  Unremarkable osseous structures.    IMPRESSION:  Clear lungs.

## 2024-02-05 LAB
ANION GAP SERPL CALC-SCNC: 14 MMOL/L — SIGNIFICANT CHANGE UP (ref 7–14)
B PERT DNA SPEC QL NAA+PROBE: SIGNIFICANT CHANGE UP
B PERT+PARAPERT DNA PNL SPEC NAA+PROBE: SIGNIFICANT CHANGE UP
BORDETELLA PARAPERTUSSIS (RAPRVP): SIGNIFICANT CHANGE UP
BUN SERPL-MCNC: 5 MG/DL — LOW (ref 7–23)
C PNEUM DNA SPEC QL NAA+PROBE: SIGNIFICANT CHANGE UP
CALCIUM SERPL-MCNC: 8.7 MG/DL — SIGNIFICANT CHANGE UP (ref 8.4–10.5)
CHLORIDE SERPL-SCNC: 101 MMOL/L — SIGNIFICANT CHANGE UP (ref 98–107)
CO2 SERPL-SCNC: 17 MMOL/L — LOW (ref 22–31)
CREAT SERPL-MCNC: 0.47 MG/DL — LOW (ref 0.5–1.3)
EGFR: 130 ML/MIN/1.73M2 — SIGNIFICANT CHANGE UP
FLUAV SUBTYP SPEC NAA+PROBE: SIGNIFICANT CHANGE UP
FLUBV RNA SPEC QL NAA+PROBE: SIGNIFICANT CHANGE UP
GLUCOSE SERPL-MCNC: 95 MG/DL — SIGNIFICANT CHANGE UP (ref 70–99)
HADV DNA SPEC QL NAA+PROBE: SIGNIFICANT CHANGE UP
HCOV 229E RNA SPEC QL NAA+PROBE: SIGNIFICANT CHANGE UP
HCOV HKU1 RNA SPEC QL NAA+PROBE: SIGNIFICANT CHANGE UP
HCOV NL63 RNA SPEC QL NAA+PROBE: SIGNIFICANT CHANGE UP
HCOV OC43 RNA SPEC QL NAA+PROBE: SIGNIFICANT CHANGE UP
HCT VFR BLD CALC: 24.2 % — LOW (ref 34.5–45)
HGB BLD-MCNC: 8.5 G/DL — LOW (ref 11.5–15.5)
HMPV RNA SPEC QL NAA+PROBE: SIGNIFICANT CHANGE UP
HPIV1 RNA SPEC QL NAA+PROBE: SIGNIFICANT CHANGE UP
HPIV2 RNA SPEC QL NAA+PROBE: SIGNIFICANT CHANGE UP
HPIV3 RNA SPEC QL NAA+PROBE: SIGNIFICANT CHANGE UP
HPIV4 RNA SPEC QL NAA+PROBE: SIGNIFICANT CHANGE UP
M PNEUMO DNA SPEC QL NAA+PROBE: SIGNIFICANT CHANGE UP
MAGNESIUM SERPL-MCNC: 1.7 MG/DL — SIGNIFICANT CHANGE UP (ref 1.6–2.6)
MCHC RBC-ENTMCNC: 29.6 PG — SIGNIFICANT CHANGE UP (ref 27–34)
MCHC RBC-ENTMCNC: 35.1 GM/DL — SIGNIFICANT CHANGE UP (ref 32–36)
MCV RBC AUTO: 84.3 FL — SIGNIFICANT CHANGE UP (ref 80–100)
NRBC # BLD: 0 /100 WBCS — SIGNIFICANT CHANGE UP (ref 0–0)
NRBC # FLD: 0 K/UL — SIGNIFICANT CHANGE UP (ref 0–0)
PHOSPHATE SERPL-MCNC: 2.7 MG/DL — SIGNIFICANT CHANGE UP (ref 2.5–4.5)
PLATELET # BLD AUTO: 44 K/UL — LOW (ref 150–400)
POTASSIUM SERPL-MCNC: 3.3 MMOL/L — LOW (ref 3.5–5.3)
POTASSIUM SERPL-SCNC: 3.3 MMOL/L — LOW (ref 3.5–5.3)
RAPID RVP RESULT: SIGNIFICANT CHANGE UP
RBC # BLD: 2.87 M/UL — LOW (ref 3.8–5.2)
RBC # FLD: 17 % — HIGH (ref 10.3–14.5)
RSV RNA SPEC QL NAA+PROBE: SIGNIFICANT CHANGE UP
RV+EV RNA SPEC QL NAA+PROBE: SIGNIFICANT CHANGE UP
SARS-COV-2 RNA SPEC QL NAA+PROBE: SIGNIFICANT CHANGE UP
SODIUM SERPL-SCNC: 132 MMOL/L — LOW (ref 135–145)
WBC # BLD: 0.67 K/UL — CRITICAL LOW (ref 3.8–10.5)
WBC # FLD AUTO: 0.67 K/UL — CRITICAL LOW (ref 3.8–10.5)

## 2024-02-05 PROCEDURE — 99232 SBSQ HOSP IP/OBS MODERATE 35: CPT

## 2024-02-05 RX ORDER — POTASSIUM CHLORIDE 20 MEQ
10 PACKET (EA) ORAL
Refills: 0 | Status: COMPLETED | OUTPATIENT
Start: 2024-02-05 | End: 2024-02-05

## 2024-02-05 RX ORDER — POTASSIUM CHLORIDE 20 MEQ
40 PACKET (EA) ORAL ONCE
Refills: 0 | Status: COMPLETED | OUTPATIENT
Start: 2024-02-05 | End: 2024-02-05

## 2024-02-05 RX ADMIN — Medication 500000 UNIT(S): at 14:33

## 2024-02-05 RX ADMIN — Medication 500000 UNIT(S): at 05:20

## 2024-02-05 RX ADMIN — MUPIROCIN 1 APPLICATION(S): 20 OINTMENT TOPICAL at 17:52

## 2024-02-05 RX ADMIN — Medication 40 MILLIEQUIVALENT(S): at 06:01

## 2024-02-05 RX ADMIN — Medication 500000 UNIT(S): at 21:35

## 2024-02-05 RX ADMIN — Medication 650 MILLIGRAM(S): at 04:30

## 2024-02-05 RX ADMIN — Medication 1 DROP(S): at 12:01

## 2024-02-05 RX ADMIN — Medication 1 DROP(S): at 00:00

## 2024-02-05 RX ADMIN — Medication 1 DROP(S): at 17:52

## 2024-02-05 RX ADMIN — CEFEPIME 100 MILLIGRAM(S): 1 INJECTION, POWDER, FOR SOLUTION INTRAMUSCULAR; INTRAVENOUS at 14:28

## 2024-02-05 RX ADMIN — Medication 100 MILLIEQUIVALENT(S): at 07:10

## 2024-02-05 RX ADMIN — Medication 1 APPLICATION(S): at 05:19

## 2024-02-05 RX ADMIN — Medication 3 MILLIGRAM(S): at 21:35

## 2024-02-05 RX ADMIN — Medication 1 DROP(S): at 23:40

## 2024-02-05 RX ADMIN — Medication 1 APPLICATION(S): at 21:36

## 2024-02-05 RX ADMIN — Medication 100 MILLIEQUIVALENT(S): at 10:43

## 2024-02-05 RX ADMIN — Medication 1 DROP(S): at 05:20

## 2024-02-05 RX ADMIN — CEFEPIME 100 MILLIGRAM(S): 1 INJECTION, POWDER, FOR SOLUTION INTRAMUSCULAR; INTRAVENOUS at 21:35

## 2024-02-05 RX ADMIN — MUPIROCIN 1 APPLICATION(S): 20 OINTMENT TOPICAL at 05:19

## 2024-02-05 RX ADMIN — Medication 1 APPLICATION(S): at 14:34

## 2024-02-05 RX ADMIN — Medication 100 MILLIEQUIVALENT(S): at 06:01

## 2024-02-05 RX ADMIN — Medication 650 MILLIGRAM(S): at 03:57

## 2024-02-05 RX ADMIN — CEFEPIME 100 MILLIGRAM(S): 1 INJECTION, POWDER, FOR SOLUTION INTRAMUSCULAR; INTRAVENOUS at 05:20

## 2024-02-05 NOTE — PROVIDER CONTACT NOTE (OTHER) - ASSESSMENT
Patient is febrile 101.9. Patient denies SOB, chest pain, and discomfort. No other acute changes noted. Patient is currently resting comfortably in bed. Patient safety, comfort and fall risk maintained. Turned and repositioned Q2 hours. Bed alarm on and call bell within reach.
Patient is febrile 102.4. Patient denies SOB, chest pain, and discomfort. No other acute changes noted. Patient is currently resting comfortably in bed. Patient safety, comfort and fall risk maintained. Turned and repositioned Q2 hours. Bed alarm on and call bell within reach.
Pt complaining of cough with production of mucus.

## 2024-02-05 NOTE — PROGRESS NOTE ADULT - SUBJECTIVE AND OBJECTIVE BOX
Patient is a 33y old  Female who presents with a chief complaint of neutropenia, anemia, pre-septal cellulitis (04 Feb 2024 16:38)  Non english speaking.    Medication:   acetaminophen     Tablet .. 650 milliGRAM(s) Oral every 6 hours PRN  artificial tears (preservative free) Ophthalmic Solution 1 Drop(s) Both EYES four times a day  cefepime   IVPB 2000 milliGRAM(s) IV Intermittent every 8 hours  erythromycin   Ointment 1 Application(s) Left EYE three times a day  melatonin 3 milliGRAM(s) Oral at bedtime PRN  mupirocin 2% Ointment 1 Application(s) Topical two times a day  nystatin    Suspension 934618 Unit(s) Oral three times a day      Physical exam    T(C): 36.8 (02-05-24 @ 15:20), Max: 37.1 (02-05-24 @ 02:00)  HR: 99 (02-05-24 @ 15:20) (92 - 101)  BP: 133/95 (02-05-24 @ 15:20) (130/88 - 134/92)  RR: 17 (02-05-24 @ 15:20) (17 - 18)  SpO2: 100% (02-05-24 @ 15:20) (99% - 100%)  Wt(kg): --    alert NAD non toxic appearing  EOMI anicteric sclera  Cv s1 S2 RRR  Lungs clear B/L  abd soft NT ND +BS  No LE edema or tenderness    Labs                         8.5    0.67  )-----------( 44       ( 05 Feb 2024 04:00 )             24.2       02-05    132<L>  |  101  |  5<L>  ----------------------------<  95  3.3<L>   |  17<L>  |  0.47<L>    Ca    8.7      05 Feb 2024 04:00  Phos  2.7     02-05  Mg     1.70     02-05            [id number]

## 2024-02-05 NOTE — PROVIDER CONTACT NOTE (CRITICAL VALUE NOTIFICATION) - ACTION/TREATMENT ORDERED:
ACP Anai Pineda made aware. Patient unable to receive PRBC's at this time per ACP.
ACP made aware
Awaiting new orders
Maria Luisa Wilson made aware

## 2024-02-05 NOTE — PROVIDER CONTACT NOTE (OTHER) - REASON
Patient is febrile 102.4. ACP requested to hold off on blood transfusion after 2nd platelet transfusion
Patient is febrile 101.9. ACP requested to administer ofirmev and hold blood transfusion
Pt complaining of cough

## 2024-02-05 NOTE — PROGRESS NOTE ADULT - ASSESSMENT
Ms. Dc is a 33 year old F with history of breast cancer (right breast s/p surgery including axillary node dissection, chemotherapy) who presents with swelling, inflammation  presents with redness and swelling of the right eye for the last 1-2 days.        Problem/Plan - 1:  ·  Problem: Preseptal cellulitis.   ·  Plan: -CT orbit showed left sided pre-septal cellulitis, no evidence of orbital cellulitis or abscess. left mediport with tip at SVC, carious left sided third maxillary molar tooth.   -no pain with EOMI   -will treat broadly with Vanc and Unasyn for now  -MRSA swab ordered   -blood culturex2   -narrow antibiotics accordingly.     Problem/Plan - 2:  ·  Problem: Breast cancer.   ·  Plan: s/p surgery and currently undergoing chemotherapy   -I called [facility] office at [phone] to request consult/discuss with oncologist overnight regarding filgrastim considering severe neutropenia. No one responded overnight.   -pain control with tylenol and oxycodone as needed.     Problem/Plan - 3:  ·  Problem: Neutropenia.   ·  Plan: neutrophil 1%, ANC 0  -gets GCSF monthly injections   -patient likely needs filgrastim   -No one from [facility] responded to call overnight.   -reverse isolation   -continue to monitor CBC daily   -patient at risk for mucor given immunosuppression and severe neutropenia- monitor for changes in sensation/progression of disease.   -Team in AM to call [facility]-number above to give GCSF.     Problem/Plan - 4:  ·  Problem: Thrombocytopenia.   ·  Plan: PLT count 9  -likely in the setting of chemotherapy  -consider IVIG and steroids if drop from baseline.   -obtain baseline PLT count from oncologist.     Problem/Plan - 5:  ·  Problem: Elevated alkaline phosphatase level.   ·  Plan: -Alk phos 336   -GGT in AM  -ABD US ordered to evaluate for GB path.     Problem/Plan - 6:  ·  Problem: Hyponatremia.   ·  Plan: -Na 130   -urine Na, urine osm and serum osm ordered  -continue to monitor BMP daily   -will not correct Na more than 6-8 meq in 24 hours.     Problem/Plan - 7:  ·  Problem: Anemia.   ·  Plan: H/H 6.6/19  -giving 1 unit of PRBC overnight   -CBC ordered stat  -Hb goal>7  -continue to monitor CBC closely  -hemolysis labs ordered.     Problem/Plan - 8:  ·  Problem: Thrush.   ·  Plan: -thrush present in mouth   -Nystatin swish and spit ordered  -no s/s of dysphagia concerning for candidal esophagitis   -continue to monitor.     Problem/Plan - 9:  ·  Problem: Need for prophylactic measure.   ·  Plan: DVT prophylaxis: SCD and OOB   GI prophylaxis: none   Diet: cardiac diet   Full code.

## 2024-02-05 NOTE — PROVIDER CONTACT NOTE (OTHER) - ACTION/TREATMENT ORDERED:
ACp made aware. RSV swab completed.
ACP Anai Pineda aware. ACP ordered to hold blood transfusion. Tylenol administered as ordered. Repeat temperature check in one hour. ACP at bedside assessing patient.
ACP Anai Pineda aware. ACP ordered to hold blood transfusion and administer Iv tylenol. Repeat temperature check in one hour. RVP and MRSA swab sent as ordered.

## 2024-02-05 NOTE — PROVIDER CONTACT NOTE (CRITICAL VALUE NOTIFICATION) - BACKGROUND
Patient is a 33 year old woman PMH x breast cancer who presents with left eye swelling
Pt admitted for periorbital cellulitis
Pt admitted for periorbital cellulitis

## 2024-02-05 NOTE — PROVIDER CONTACT NOTE (OTHER) - BACKGROUND
33 year old F with history of breast cancer (right breast s/p surgery including axillary node dissection, chemotherapy)

## 2024-02-05 NOTE — PROGRESS NOTE ADULT - ASSESSMENT
33-year-old female with triple negative breast cancer and currently getting AC with Keytruda as out-pt. Had last treatment with Udenyca ~ 10 days ago. Admitted with pancytopenia, cellulitis, and neutropenic fever. Getting iv antibiotics. Afebrile. CBC counts noted. Platelets lower but adequate and Hgb stable and adequate. No need for a transfusion at this time. Monitor. Neutropenia minimally better. based upon timing of Udenyca, suspect that there has been a beba and expect improvement in next 2-3 days. Neutropenic precautions.

## 2024-02-05 NOTE — PROGRESS NOTE ADULT - SUBJECTIVE AND OBJECTIVE BOX
Patient is a 33y old  Female who presents with a chief complaint of neutropenia, anemia, pre-septal cellulitis (05 Feb 2024 20:09)      HPI:  Ms. Dc is a 33 year old F with history of breast cancer (right breast s/p surgery including axillary node dissection, chemotherapy) who presents with swelling, inflammation  presents with redness and swelling of the right eye for the last 1-2 days. She reports fever (102F), chills, excessive tearing. Denies any pain with extra-occular movement, vision problems, or purulent discharge. She denies any sick contacts. Per patient she has had inflammation of the left eye 1-2x before, but she has never required antibiotics. She recently started a course of antibiotics- augmentin yesterday and had 2 doses. Her last dose of chemotherapy was January 14th and she received an GCSF injection on January 15th. Her last chemotherapy date is scheduled for April 2024. CT orbit showed left sided pre-septal cellulitis, no evidence of orbital cellulitis or abscess. left mediport with tip at SVC, carious left sided third maxillary molar tooth. She also reports daily nausea and vomiting every day since her chemotherapy. EKG as interpreted by me shows the following: , ST, QTc 413 ms, no ST/T changes. She has previous ear infections in the past since she started chemotherapy.     Labs were sig for the following: WBC 0.39, ANC 0.00, decrease PLT count, 1% neutrophil, PT/PTT 36.9, Na 129, CL 93, CO2 20, AG 16, BUN/Cr 12/0.72, Alk phos 336, LA 1.0, Hb/HCT 6.6/19, trop 10->10.       Her oncologist is Dr. Nasir Gondal. She was given a dose of Vanc in the ED and 1 unit of PRBC.  (03 Feb 2024 00:32)      PAST MEDICAL & SURGICAL HISTORY:  Breast cancer      Neutropenia      H/O breast surgery      Breast cancer metastasized to axillary lymph node      Port-A-Cath in place          PREVIOUS DIAGNOSTIC TESTING:      ECHO  FINDINGS:    STRESS  FINDINGS:    CATHETERIZATION  FINDINGS:    MEDICATIONS  (STANDING):  artificial tears (preservative free) Ophthalmic Solution 1 Drop(s) Both EYES four times a day  cefepime   IVPB 2000 milliGRAM(s) IV Intermittent every 8 hours  erythromycin   Ointment 1 Application(s) Left EYE three times a day  mupirocin 2% Ointment 1 Application(s) Topical two times a day  nystatin    Suspension 524083 Unit(s) Oral three times a day    MEDICATIONS  (PRN):  acetaminophen     Tablet .. 650 milliGRAM(s) Oral every 6 hours PRN Temp greater or equal to 38C (100.4F), Mild Pain (1 - 3)  melatonin 3 milliGRAM(s) Oral at bedtime PRN Insomnia      FAMILY HISTORY:  No pertinent family history in first degree relatives        SOCIAL HISTORY:    CIGARETTES:    ALCOHOL:    REVIEW OF SYSTEMS:  CONSTITUTIONAL: No fever, weight loss, or fatigue  EYES: No eye pain, visual disturbances, or discharge  ENMT:  No difficulty hearing, tinnitus, vertigo; No sinus or throat pain  NECK: No pain or stiffness  RESPIRATORY: No cough, wheezing, chills or hemoptysis; No shortness of breath  CARDIOVASCULAR: No chest pain, palpitations, dizziness, or leg swelling  GASTROINTESTINAL: No abdominal or epigastric pain. No nausea, vomiting, or hematemesis; No diarrhea or constipation. No melena or hematochezia.  GENITOURINARY: No dysuria, frequency, hematuria, or incontinence  NEUROLOGICAL: No headaches, memory loss, loss of strength, numbness, or tremors  SKIN: No itching, burning, rashes, or lesions   LYMPH NODES: No enlarged glands  ENDOCRINE: No heat or cold intolerance; No hair loss  MUSCULOSKELETAL: No joint pain or swelling; No muscle, back, or extremity pain  PSYCHIATRIC: No depression, anxiety, mood swings, or difficulty sleeping  HEME/LYMPH: No easy bruising, or bleeding gums  ALLERY AND IMMUNOLOGIC: No hives or eczema    Vital Signs Last 24 Hrs  T(C): 36.8 (05 Feb 2024 15:20), Max: 37.1 (05 Feb 2024 02:00)  T(F): 98.2 (05 Feb 2024 15:20), Max: 98.7 (05 Feb 2024 02:00)  HR: 99 (05 Feb 2024 15:20) (92 - 101)  BP: 133/95 (05 Feb 2024 15:20) (130/88 - 134/92)  BP(mean): --  RR: 17 (05 Feb 2024 15:20) (17 - 18)  SpO2: 100% (05 Feb 2024 15:20) (99% - 100%)    Parameters below as of 05 Feb 2024 15:20  Patient On (Oxygen Delivery Method): room air          PHYSICAL EXAM:  GENERAL: NAD, well-groomed, well-developed  HEAD:  Atraumatic, Normocephalic  EYES: EOMI, PERRLA, conjunctiva and sclera clear  ENMT: No tonsillar erythema, exudates, or enlargement; Moist mucous membranes, Good dentition, No lesions  NECK: Supple, No JVD, Normal thyroid  NERVOUS SYSTEM:  Alert & Oriented X3, Good concentration; Motor Strength 5/5 B/L upper and lower extremities; DTRs 2+ intact and symmetric  CHEST/LUNG: Clear to percussion bilaterally; No rales, rhonchi, wheezing, or rubs  HEART: Regular rate and rhythm; No murmurs, rubs, or gallops  ABDOMEN: Soft, Nontender, Nondistended; Bowel sounds present  EXTREMITIES:  2+ Peripheral Pulses, No clubbing, cyanosis, or edema  LYMPH: No lymphadenopathy noted  SKIN: No rashes or lesions      INTERPRETATION OF TELEMETRY:    ECG:    CHADSVASC:     LABS:                        8.5    0.67  )-----------( 44       ( 05 Feb 2024 04:00 )             24.2     02-05    132<L>  |  101  |  5<L>  ----------------------------<  95  3.3<L>   |  17<L>  |  0.47<L>    Ca    8.7      05 Feb 2024 04:00  Phos  2.7     02-05  Mg     1.70     02-05            Urinalysis Basic - ( 05 Feb 2024 04:00 )    Color: x / Appearance: x / SG: x / pH: x  Gluc: 95 mg/dL / Ketone: x  / Bili: x / Urobili: x   Blood: x / Protein: x / Nitrite: x   Leuk Esterase: x / RBC: x / WBC x   Sq Epi: x / Non Sq Epi: x / Bacteria: x      Lipid Panel:   I&O's Summary    04 Feb 2024 07:01  -  05 Feb 2024 07:00  --------------------------------------------------------  IN: 900 mL / OUT: 0 mL / NET: 900 mL        RADIOLOGY & ADDITIONAL STUDIES:

## 2024-02-05 NOTE — PROGRESS NOTE ADULT - ASSESSMENT
33 F with Breast cancer (right breast s/p surgery including axillary node dissection, on chemotherapy last session 1/14/24) presents with redness and swelling of the L eye  Fever, neutropenia  CXR clear  CT L preseptal cellulitis without orbital involvement  RVP neg  L sided mediport  Preseptal cellulitis--improving  MRSA positive  Overall, Neutropenic fever, preseptal cellulitis  - Cefepime 2g q 8  - Low threshold to add vanco if worsening  - Monitor site for improvement  - Trend ANC/WBC  - F/U BCXs  - Appreciate optho chase Sofia MD  Contact on TEAMS messaging from 9am - 5pm  From 5pm-9am, on weekends, or if no response call [phone]

## 2024-02-05 NOTE — PROGRESS NOTE ADULT - SUBJECTIVE AND OBJECTIVE BOX
CC: F/U for Preseptal cellulitis    Saw/spoke to patient. No fevers, no chills. No new complaints. Improved.    Allergies  No Known Allergies    ANTIMICROBIALS:  cefepime   IVPB 2000 every 8 hours  nystatin    Suspension [id number] three times a day    PE:    Vital Signs Last 24 Hrs  T(C): 36.8 (05 Feb 2024 06:00), Max: 37.1 (05 Feb 2024 02:00)  T(F): 98.2 (05 Feb 2024 06:00), Max: 98.7 (05 Feb 2024 02:00)  HR: 101 (05 Feb 2024 06:00) (92 - 116)  BP: 134/92 (05 Feb 2024 06:00) (130/88 - 137/93)  RR: 18 (05 Feb 2024 06:00) (18 - 18)  SpO2: 99% (05 Feb 2024 06:00) (99% - 100%)    Gen: AOx3, NAD, non-toxic  CV: Nontachycardic  Resp: Breathing comfortably, RA  Abd: Soft, nontender  IV/Skin: No thrombophlebitis    LABS:                        8.5    0.67  )-----------( 44       ( 05 Feb 2024 04:00 )             24.2     02-05    132<L>  |  101  |  5<L>  ----------------------------<  95  3.3<L>   |  17<L>  |  0.47<L>    Ca    8.7      05 Feb 2024 04:00  Phos  2.7     02-05  Mg     1.70     02-05    Urinalysis Basic - ( 05 Feb 2024 04:00 )    Color: x / Appearance: x / SG: x / pH: x  Gluc: 95 mg/dL / Ketone: x  / Bili: x / Urobili: x   Blood: x / Protein: x / Nitrite: x   Leuk Esterase: x / RBC: x / WBC x   Sq Epi: x / Non Sq Epi: x / Bacteria: x    MICROBIOLOGY:    .Blood Blood-Peripheral  02-03-24   No growth at 24 hours  --  --    .Blood Blood-Peripheral  02-03-24   No growth at 48 Hours  --  --    Clean Catch Clean Catch (Midstream)  02-02-24   <10,000 CFU/mL Normal Urogenital Kaylee  --  --    Rapid RVP Result: NotDetec (02-05 @ 02:13)  Rapid RVP Result: NotDetec (02-03 @ 10:20)    RADIOLOGY:    2/4 CT    IMPRESSION:  Asymmetric right breast thickening with multiple enhancing right breast   and axillary nodules in keeping with patient's history of breast cancer.   Recommend correlating with patient's dedicated breast imaging.    Small right and trace left pleural effusions. No suspicious pulmonary   nodules.    No evidence for metastatic disease within the abdomen or pelvis.    Incidental note of prominent endometrium which may relate to patient's   phase of menses. This can be correlated with a nonemergent pelvic   ultrasound.

## 2024-02-05 NOTE — PROGRESS NOTE ADULT - SUBJECTIVE AND OBJECTIVE BOX
SUBJECTIVE / OVERNIGHT EVENTS:pt seen and examined  02-05-24     MEDICATIONS  (STANDING):  artificial tears (preservative free) Ophthalmic Solution 1 Drop(s) Both EYES four times a day  cefepime   IVPB 2000 milliGRAM(s) IV Intermittent every 8 hours  erythromycin   Ointment 1 Application(s) Left EYE three times a day  mupirocin 2% Ointment 1 Application(s) Topical two times a day  nystatin    Suspension 187958 Unit(s) Oral three times a day    MEDICATIONS  (PRN):  acetaminophen     Tablet .. 650 milliGRAM(s) Oral every 6 hours PRN Temp greater or equal to 38C (100.4F), Mild Pain (1 - 3)  melatonin 3 milliGRAM(s) Oral at bedtime PRN Insomnia    Vital Signs Last 24 Hrs  T(C): 36.7 (02-05-24 @ 21:30), Max: 37.1 (02-05-24 @ 02:00)  T(F): 98.1 (02-05-24 @ 21:30), Max: 98.7 (02-05-24 @ 02:00)  HR: 93 (02-05-24 @ 21:30) (92 - 101)  BP: 138/98 (02-05-24 @ 21:30) (130/88 - 138/98)  BP(mean): --  RR: 18 (02-05-24 @ 21:30) (17 - 18)  SpO2: 100% (02-05-24 @ 21:30) (99% - 100%)      Constitutional: No fever, fatigue  Skin: No rash.  Eyes: No recent vision problems or eye pain.  ENT: No congestion, ear pain, or sore throat.  Cardiovascular: No chest pain or palpation.  Respiratory: No cough, shortness of breath, congestion, or wheezing.  Gastrointestinal: No abdominal pain, nausea, vomiting, or diarrhea.  Genitourinary: No dysuria.  Musculoskeletal: No joint swelling.  Neurologic: No headache.    PHYSICAL EXAM:  GENERAL: NAD  EYES: left eye erythema+  NECK: Supple, No JVD  CHEST/LUNG: dec breath sounds at bases  HEART:  S1 , S2 +  ABDOMEN: soft , bs+  EXTREMITIES:  no edema  NEUROLOGY:alert awake      LABS:  02-05    132<L>  |  101  |  5<L>  ----------------------------<  95  3.3<L>   |  17<L>  |  0.47<L>    Ca    8.7      05 Feb 2024 04:00  Phos  2.7     02-05  Mg     1.70     02-05      Creatinine Trend: 0.47 <--, 0.54 <--, 0.72 <--                        8.5    0.67  )-----------( 44       ( 05 Feb 2024 04:00 )             24.2     Urine Studies:  Urinalysis Basic - ( 05 Feb 2024 04:00 )    Color:  / Appearance:  / SG:  / pH:   Gluc: 95 mg/dL / Ketone:   / Bili:  / Urobili:    Blood:  / Protein:  / Nitrite:    Leuk Esterase:  / RBC:  / WBC    Sq Epi:  / Non Sq Epi:  / Bacteria:                       RADIOLOGY & ADDITIONAL TESTS:    Imaging Personally Reviewed:    Consultant(s) Notes Reviewed:      Care Discussed with Consultants/Other Providers:

## 2024-02-05 NOTE — PROVIDER CONTACT NOTE (OTHER) - SITUATION
Patient is febrile 101.9. ACP requested to administer ofirmev and hold blood transfusion
Patient is febrile 102.4. ACP requested to hold off on blood transfusion after 2nd platelet transfusion
Pt complaining of cough with production of mucus.

## 2024-02-05 NOTE — PROVIDER CONTACT NOTE (CRITICAL VALUE NOTIFICATION) - ASSESSMENT
Pt in no distress
Patient denies SOB, chest pain, and discomfort. No acute changes noted. Patient is currently resting comfortably in bed. Patient safety, comfort and fall risk maintained. Turned and repositioned Q2 hours. Bed alarm on and call bell within reach.
Pt in no distress

## 2024-02-06 LAB
ANION GAP SERPL CALC-SCNC: 15 MMOL/L — HIGH (ref 7–14)
BUN SERPL-MCNC: 6 MG/DL — LOW (ref 7–23)
CALCIUM SERPL-MCNC: 9.4 MG/DL — SIGNIFICANT CHANGE UP (ref 8.4–10.5)
CHLORIDE SERPL-SCNC: 100 MMOL/L — SIGNIFICANT CHANGE UP (ref 98–107)
CO2 SERPL-SCNC: 19 MMOL/L — LOW (ref 22–31)
CREAT SERPL-MCNC: 0.44 MG/DL — LOW (ref 0.5–1.3)
EGFR: 132 ML/MIN/1.73M2 — SIGNIFICANT CHANGE UP
GLUCOSE SERPL-MCNC: 118 MG/DL — HIGH (ref 70–99)
HCT VFR BLD CALC: 27.4 % — LOW (ref 34.5–45)
HGB BLD-MCNC: 9.5 G/DL — LOW (ref 11.5–15.5)
MAGNESIUM SERPL-MCNC: 1.9 MG/DL — SIGNIFICANT CHANGE UP (ref 1.6–2.6)
MCHC RBC-ENTMCNC: 29.4 PG — SIGNIFICANT CHANGE UP (ref 27–34)
MCHC RBC-ENTMCNC: 34.7 GM/DL — SIGNIFICANT CHANGE UP (ref 32–36)
MCV RBC AUTO: 84.8 FL — SIGNIFICANT CHANGE UP (ref 80–100)
NRBC # BLD: 0 /100 WBCS — SIGNIFICANT CHANGE UP (ref 0–0)
NRBC # FLD: 0 K/UL — SIGNIFICANT CHANGE UP (ref 0–0)
PHOSPHATE SERPL-MCNC: 3.8 MG/DL — SIGNIFICANT CHANGE UP (ref 2.5–4.5)
PLATELET # BLD AUTO: 37 K/UL — LOW (ref 150–400)
POTASSIUM SERPL-MCNC: 3.9 MMOL/L — SIGNIFICANT CHANGE UP (ref 3.5–5.3)
POTASSIUM SERPL-SCNC: 3.9 MMOL/L — SIGNIFICANT CHANGE UP (ref 3.5–5.3)
RBC # BLD: 3.23 M/UL — LOW (ref 3.8–5.2)
RBC # FLD: 16.5 % — HIGH (ref 10.3–14.5)
SODIUM SERPL-SCNC: 134 MMOL/L — LOW (ref 135–145)
WBC # BLD: 1.13 K/UL — LOW (ref 3.8–10.5)
WBC # FLD AUTO: 1.13 K/UL — LOW (ref 3.8–10.5)

## 2024-02-06 PROCEDURE — 99232 SBSQ HOSP IP/OBS MODERATE 35: CPT

## 2024-02-06 RX ADMIN — Medication 1 DROP(S): at 17:45

## 2024-02-06 RX ADMIN — Medication 500000 UNIT(S): at 05:40

## 2024-02-06 RX ADMIN — Medication 1 APPLICATION(S): at 13:43

## 2024-02-06 RX ADMIN — CEFEPIME 100 MILLIGRAM(S): 1 INJECTION, POWDER, FOR SOLUTION INTRAMUSCULAR; INTRAVENOUS at 21:18

## 2024-02-06 RX ADMIN — CEFEPIME 100 MILLIGRAM(S): 1 INJECTION, POWDER, FOR SOLUTION INTRAMUSCULAR; INTRAVENOUS at 13:44

## 2024-02-06 RX ADMIN — MUPIROCIN 1 APPLICATION(S): 20 OINTMENT TOPICAL at 17:43

## 2024-02-06 RX ADMIN — Medication 1 DROP(S): at 05:40

## 2024-02-06 RX ADMIN — CEFEPIME 100 MILLIGRAM(S): 1 INJECTION, POWDER, FOR SOLUTION INTRAMUSCULAR; INTRAVENOUS at 05:40

## 2024-02-06 RX ADMIN — Medication 1 APPLICATION(S): at 21:19

## 2024-02-06 RX ADMIN — Medication 3 MILLIGRAM(S): at 21:19

## 2024-02-06 RX ADMIN — Medication 1 APPLICATION(S): at 05:41

## 2024-02-06 RX ADMIN — Medication 500000 UNIT(S): at 21:18

## 2024-02-06 RX ADMIN — MUPIROCIN 1 APPLICATION(S): 20 OINTMENT TOPICAL at 05:41

## 2024-02-06 RX ADMIN — Medication 1 DROP(S): at 23:18

## 2024-02-06 RX ADMIN — Medication 1 DROP(S): at 13:44

## 2024-02-06 RX ADMIN — Medication 500000 UNIT(S): at 13:42

## 2024-02-06 NOTE — PROGRESS NOTE ADULT - PROBLEM SELECTOR PLAN 2
TNBC receiving neoadjuvant chemotherapy  - s/p Keytruda/Adriamycin/Cytoxan on 1/24/2024
s/p surgery and currently undergoing chemotherapy   -I called [facility] office at [phone] to request consult/discuss with oncologist overnight regarding filgrastim considering severe neutropenia. No one responded overnight.   -pain control with tylenol and oxycodone as needed.
s/p surgery and currently undergoing chemotherapy   -I called [facility] office at [phone] to request consult/discuss with oncologist overnight regarding filgrastim considering severe neutropenia. No one responded overnight.   -pain control with tylenol and oxycodone as needed.
s/p surgery and currently undergoing chemotherapy   -I called [facility] office at [phone] to request consult/discuss with oncologist overnight regarding filgrastim considering severe neutropenia. No one responded overnight.   -pain control with tylenol and oxycodone as needed.
s/p surgery and currently undergoing chemotherapy   -I called [facility] office at [phone] to request consult/discuss with oncologist overnight regarding filgrastim considering severe neutropenia. No one responded overnight.   -pain control with tylenol and oxycodone as needed.

## 2024-02-06 NOTE — PROGRESS NOTE ADULT - PROBLEM SELECTOR PLAN 4
- await CBC results  - no bleeding/bruising at this time
PLT count 9  -likely in the setting of chemotherapy  -s/p plt transfusion

## 2024-02-06 NOTE — PROGRESS NOTE ADULT - PROBLEM SELECTOR PROBLEM 1
Preseptal cellulitis

## 2024-02-06 NOTE — PROGRESS NOTE ADULT - SUBJECTIVE AND OBJECTIVE BOX
Patient is a 33y old  Female who presents with a chief complaint of neutropenia, anemia, pre-septal cellulitis (05 Feb 2024 20:09)      Medication:   acetaminophen     Tablet .. 650 milliGRAM(s) Oral every 6 hours PRN  artificial tears (preservative free) Ophthalmic Solution 1 Drop(s) Both EYES four times a day  cefepime   IVPB 2000 milliGRAM(s) IV Intermittent every 8 hours  erythromycin   Ointment 1 Application(s) Left EYE three times a day  melatonin 3 milliGRAM(s) Oral at bedtime PRN  mupirocin 2% Ointment 1 Application(s) Topical two times a day  nystatin    Suspension 142040 Unit(s) Oral three times a day      Physical exam    T(C): 36.7 (02-06-24 @ 05:30), Max: 36.9 (02-06-24 @ 01:30)  HR: 95 (02-06-24 @ 05:30) (92 - 99)  BP: 120/85 (02-06-24 @ 05:30) (120/85 - 138/98)  RR: 17 (02-06-24 @ 05:30) (17 - 18)  SpO2: 99% (02-06-24 @ 05:30) (99% - 100%)  Wt(kg): --    resting NAD  Resp non labored    Labs                              9.5    1.13  )-----------( 37       ( 06 Feb 2024 03:40 )             27.4       02-06    134<L>  |  100  |  6<L>  ----------------------------<  118<H>  3.9   |  19<L>  |  0.44<L>    Ca    9.4      06 Feb 2024 03:40  Phos  3.8     02-06  Mg     1.90     02-06            [id number]

## 2024-02-06 NOTE — PROGRESS NOTE ADULT - ASSESSMENT
33 F with Breast cancer (right breast s/p surgery including axillary node dissection, on chemotherapy last session 1/14/24) presents with redness and swelling of the L eye  Fever, neutropenia  CXR clear  CT L preseptal cellulitis without orbital involvement  RVP neg  L sided mediport  Preseptal cellulitis--improving  MRSA positive  Overall, Neutropenic fever, preseptal cellulitis  - Cefepime 2g q 8, when discharge planning, can send out on PO Keflex 500mg q 6 to complete 10 days from start of Cefepime for preseptal cellulitis (if patient is still neutropenic at time of discharge, also needs Cipro 500mg q 12 to complete through resolution of neutropenia and fever x 72 hours)  - Monitor site for improvement  - Trend ANC/WBC  - F/U BCXs  - Appreciate optho chase    My colleagues will be covering this patient starting on 2/7/24. I will return 2/8/24. Please call [phone] or on call fellow with any questions or change in status.     Valentino Sofia MD  Contact on TEAMS messaging from 9am - 5pm  From 5pm-9am, on weekends, or if no response call [phone]

## 2024-02-06 NOTE — PROGRESS NOTE ADULT - SUBJECTIVE AND OBJECTIVE BOX
CC: F/U for Preseptal cellulitis    Saw/spoke to patient. No fevers, no chills. No new complaints.    Allergies  No Known Allergies    ANTIMICROBIALS:  cefepime   IVPB 2000 every 8 hours  nystatin    Suspension 801988 three times a day    PE:    Vital Signs Last 24 Hrs  T(C): 36.8 (06 Feb 2024 13:30), Max: 36.9 (06 Feb 2024 01:30)  T(F): 98.3 (06 Feb 2024 13:30), Max: 98.5 (06 Feb 2024 01:30)  HR: 99 (06 Feb 2024 13:30) (92 - 99)  BP: 119/85 (06 Feb 2024 13:30) (119/85 - 138/98)  RR: 18 (06 Feb 2024 13:30) (17 - 18)  SpO2: 100% (06 Feb 2024 13:30) (99% - 100%)    Gen: AOx3, NAD, non-toxic  CV: Nontachycardic  Resp: Breathing comfortably, RA  Abd: Soft, nontender  IV/Skin: No thrombophlebitis    LABS:                        9.5    1.13  )-----------( 37       ( 06 Feb 2024 03:40 )             27.4     02-06    134<L>  |  100  |  6<L>  ----------------------------<  118<H>  3.9   |  19<L>  |  0.44<L>    Ca    9.4      06 Feb 2024 03:40  Phos  3.8     02-06  Mg     1.90     02-06    Urinalysis Basic - ( 06 Feb 2024 03:40 )    Color: x / Appearance: x / SG: x / pH: x  Gluc: 118 mg/dL / Ketone: x  / Bili: x / Urobili: x   Blood: x / Protein: x / Nitrite: x   Leuk Esterase: x / RBC: x / WBC x   Sq Epi: x / Non Sq Epi: x / Bacteria: x    MICROBIOLOGY:    .Blood Blood-Peripheral  02-03-24   No growth at 48 Hours  --  --    .Blood Blood-Peripheral  02-03-24   No growth at 72 Hours  --  --    Clean Catch Clean Catch (Midstream)  02-02-24   <10,000 CFU/mL Normal Urogenital Kaylee  --  --    Rapid RVP Result: NotDetec (02-05 @ 02:13)  Rapid RVP Result: NotDetec (02-03 @ 10:20)    RADIOLOGY:    RUQ 2/4:    FINDINGS:  Liver: Within normal limits.  Bile ducts: Normal caliber. Common bile duct measures 2 mm.  Gallbladder: Within normal limits.  Pancreas: Visualized portions are within normal limits.  Right kidney: 9.6 cm. No hydronephrosis.  Ascites: None.  IVC: Visualized portions are within normal limits.    Incidental RIGHT pleural effusion    IMPRESSION:  Normal right upper quadrant abdominal ultrasound.

## 2024-02-06 NOTE — PROGRESS NOTE ADULT - SUBJECTIVE AND OBJECTIVE BOX
MR#[id number]  PATIENT NAME:EDUARDO DC    DATE OF SERVICE: 02-06-24   Patient was seen and examined by Jose M Shelley MD on    02-06-24   Interim events noted.Consultant notes ,Labs,Telemetry reviewed by me       HOSPITAL COURSE: HPI:  Ms. Dc is a 33 year old F with history of breast cancer (right breast s/p surgery including axillary node dissection, chemotherapy) who presents with swelling, inflammation  presents with redness and swelling of the right eye for the last 1-2 days. She reports fever (102F), chills, excessive tearing. Denies any pain with extra-occular movement, vision problems, or purulent discharge. She denies any sick contacts. Per patient she has had inflammation of the left eye 1-2x before, but she has never required antibiotics. She recently started a course of antibiotics- augmentin yesterday and had 2 doses. Her last dose of chemotherapy was January 14th and she received an GCSF injection on January 15th. Her last chemotherapy date is scheduled for April 2024. CT orbit showed left sided pre-septal cellulitis, no evidence of orbital cellulitis or abscess. left mediport with tip at SVC, carious left sided third maxillary molar tooth. She also reports daily nausea and vomiting every day since her chemotherapy. EKG as interpreted by me shows the following: , ST, QTc 413 ms, no ST/T changes. She has previous ear infections in the past since she started chemotherapy.     Labs were sig for the following: WBC 0.39, ANC 0.00, decrease PLT count, 1% neutrophil, PT/PTT 36.9, Na 129, CL 93, CO2 20, AG 16, BUN/Cr 12/0.72, Alk phos 336, LA 1.0, Hb/HCT 6.6/19, trop 10->10.       Her oncologist is Dr. Nasir Gondal. She was given a dose of Vanc in the ED and 1 unit of PRBC.  (03 Feb 2024 00:32)      INTERIM EVENTS:Patient seen at bedside ,interim events noted.      PMH -reviewed admission note, no change since admission  HEART FAILURE: Acute[ ]Chronic[ ] Systolic[ ] Diastolic[ ] Combined Systolic and Diastolic[ ]  CAD[ ] CABG[ ] PCI[ ]  DEVICES[ ] PPM[ ] ICD[ ] ILR[ ]  ATRIAL FIBRILLATION[ ] Paroxysmal[ ] Permanent[ ] CHADS2-[  ]  BENTON[ ] CKD1[ ] CKD2[ ] CKD3[ ] CKD4[ ] ESRD[ ]  COPD[ ] HTN[ ]   DM[ ] Type1[ ] Type 2[ ]   CVA[ ] Paresis[ ]    AMBULATION: Assisted[ ] Cane/walker[ ] Independent[ ]    MEDICATIONS  (STANDING):  artificial tears (preservative free) Ophthalmic Solution 1 Drop(s) Both EYES four times a day  cefepime   IVPB 2000 milliGRAM(s) IV Intermittent every 8 hours  erythromycin   Ointment 1 Application(s) Left EYE three times a day  mupirocin 2% Ointment 1 Application(s) Topical two times a day  nystatin    Suspension 282522 Unit(s) Oral three times a day    MEDICATIONS  (PRN):  acetaminophen     Tablet .. 650 milliGRAM(s) Oral every 6 hours PRN Temp greater or equal to 38C (100.4F), Mild Pain (1 - 3)  melatonin 3 milliGRAM(s) Oral at bedtime PRN Insomnia            REVIEW OF SYSTEMS:  Constitutional: [ ] fever, [ ]weight loss,  [ ]fatigue [ ]weight gain  Eyes: [ ] visual changes  Respiratory: [ ]shortness of breath;  [ ] cough, [ ]wheezing, [ ]chills, [ ]hemoptysis  Cardiovascular: [ ] chest pain, [ ]palpitations, [ ]dizziness,  [ ]leg swelling[ ]orthopnea[ ]PND  Gastrointestinal: [ ] abdominal pain, [ ]nausea, [ ]vomiting,  [ ]diarrhea [ ]Constipation [ ]Melena  Genitourinary: [ ] dysuria, [ ] hematuria [ ]Thomas  Neurologic: [ ] headaches [ ] tremors[ ]weakness [ ]Paralysis Right[ ] Left[ ]  Skin: [ ] itching, [ ]burning, [ ] rashes  Endocrine: [ ] heat or cold intolerance  Musculoskeletal: [ ] joint pain or swelling; [ ] muscle, back, or extremity pain  Psychiatric: [ ] depression, [ ]anxiety, [ ]mood swings, or [ ]difficulty sleeping  Hematologic: [ ] easy bruising, [ ] bleeding gums    [ ] All remaining systems negative except as per above.   [ ]Unable to obtain.  [x] No change in ROS since admission      Vital Signs Last 24 Hrs  T(C): 36.7 (06 Feb 2024 17:30), Max: 36.9 (06 Feb 2024 01:30)  T(F): 98.1 (06 Feb 2024 17:30), Max: 98.5 (06 Feb 2024 01:30)  HR: 89 (06 Feb 2024 17:30) (89 - 99)  BP: 127/90 (06 Feb 2024 17:30) (119/85 - 138/98)  BP(mean): --  RR: 19 (06 Feb 2024 17:30) (17 - 19)  SpO2: 98% (06 Feb 2024 17:30) (98% - 100%)    Parameters below as of 06 Feb 2024 17:30  Patient On (Oxygen Delivery Method): room air      I&O's Summary    05 Feb 2024 07:01  -  06 Feb 2024 07:00  --------------------------------------------------------  IN: 250 mL / OUT: 300 mL / NET: -50 mL        PHYSICAL EXAM:  General: No acute distress BMI-  HEENT: EOMI, PERRL  Neck: Supple, [ ] JVD  Lungs: Equal air entry bilaterally; [ ] rales [ ] wheezing [ ] rhonchi  Heart: Regular rate and rhythm; [x ] murmur   2/6 [ x] systolic [ ] diastolic [ ] radiation[ ] rubs [ ]  gallops  Abdomen: Nontender, bowel sounds present  Extremities: No clubbing, cyanosis, [ ] edema [ ]Pulses  equal and intact  Nervous system:  Alert & Oriented X3, no focal deficits  Psychiatric: Normal affect  Skin: No rashes or lesions    LABS:  02-06    134<L>  |  100  |  6<L>  ----------------------------<  118<H>  3.9   |  19<L>  |  0.44<L>    Ca    9.4      06 Feb 2024 03:40  Phos  3.8     02-06  Mg     1.90     02-06      Creatinine Trend: 0.44<--, 0.47<--, 0.54<--, 0.72<--                        9.5    1.13  )-----------( 37       ( 06 Feb 2024 03:40 )             27.4

## 2024-02-06 NOTE — PROGRESS NOTE ADULT - TIME BILLING
- Review of records, telemetry, vital signs and daily labs.   - General and cardiovascular physical examination.  - Generation of cardiovascular treatment plan.  - Coordination of care.      Patient was seen and examined by me on 1/5/24,interim events noted,labs and radiology studies reviewed.  Jose M Shelley MD,FACC.  [address] [place], [place] [id number].  [phone]
- Review of records, telemetry, vital signs and daily labs.   - General and cardiovascular physical examination.  - Generation of cardiovascular treatment plan.  - Coordination of care.      Patient was seen and examined by me on 1/6/24,interim events noted,labs and radiology studies reviewed.  Jose M Shelley MD,FACC.  [address] [place], [place] [id number].  [phone]
- Review of records, telemetry, vital signs and daily labs.   - General and cardiovascular physical examination.  - Generation of cardiovascular treatment plan.  - Coordination of care.      Patient was seen and examined by me on 1/4/24,interim events noted,labs and radiology studies reviewed.  Jose M Shelley MD,FACC.  [address] [place], [place] [id number].  [phone]

## 2024-02-06 NOTE — PROGRESS NOTE ADULT - PROBLEM SELECTOR PLAN 7
H/H 6.6/19  -transfusion as per heme onc

## 2024-02-06 NOTE — PROGRESS NOTE ADULT - PROBLEM SELECTOR PLAN 9
DVT prophylaxis: SCD and OOB   GI prophylaxis: none   Diet: cardiac diet   Full code

## 2024-02-06 NOTE — PROGRESS NOTE ADULT - SUBJECTIVE AND OBJECTIVE BOX
SUBJECTIVE / OVERNIGHT EVENTS:pt seen and examined  02-06-24     MEDICATIONS  (STANDING):  artificial tears (preservative free) Ophthalmic Solution 1 Drop(s) Both EYES four times a day  cefepime   IVPB 2000 milliGRAM(s) IV Intermittent every 8 hours  erythromycin   Ointment 1 Application(s) Left EYE three times a day  mupirocin 2% Ointment 1 Application(s) Topical two times a day  nystatin    Suspension 719094 Unit(s) Oral three times a day    MEDICATIONS  (PRN):  acetaminophen     Tablet .. 650 milliGRAM(s) Oral every 6 hours PRN Temp greater or equal to 38C (100.4F), Mild Pain (1 - 3)  melatonin 3 milliGRAM(s) Oral at bedtime PRN Insomnia      Constitutional: No fever, fatigue  Skin: No rash.  Eyes: No recent vision problems or eye pain.  Vital Signs Last 24 Hrs  T(C): 36.6 (02-06-24 @ 21:30), Max: 36.9 (02-06-24 @ 01:30)  T(F): 97.9 (02-06-24 @ 21:30), Max: 98.5 (02-06-24 @ 01:30)  HR: 99 (02-06-24 @ 21:30) (89 - 99)  BP: 111/75 (02-06-24 @ 21:30) (111/75 - 133/85)  BP(mean): --  RR: 18 (02-06-24 @ 21:30) (17 - 19)  SpO2: 100% (02-06-24 @ 21:30) (98% - 100%)    ENT: No congestion, ear pain, or sore throat.  Cardiovascular: No chest pain or palpation.  Respiratory: No cough, shortness of breath, congestion, or wheezing.  Gastrointestinal: No abdominal pain, nausea, vomiting, or diarrhea.  Genitourinary: No dysuria.  Musculoskeletal: No joint swelling.  Neurologic: No headache.    PHYSICAL EXAM:  GENERAL: NAD  EYES: left eye erythema+  NECK: Supple, No JVD  CHEST/LUNG: dec breath sounds at bases  HEART:  S1 , S2 +  ABDOMEN: soft , bs+  EXTREMITIES:  no edema  NEUROLOGY:alert awake    LABS:  02-06    134<L>  |  100  |  6<L>  ----------------------------<  118<H>  3.9   |  19<L>  |  0.44<L>    Ca    9.4      06 Feb 2024 03:40  Phos  3.8     02-06  Mg     1.90     02-06      Creatinine Trend: 0.44 <--, 0.47 <--, 0.54 <--, 0.72 <--                        9.5    1.13  )-----------( 37       ( 06 Feb 2024 03:40 )             27.4     Urine Studies:  Urinalysis Basic - ( 06 Feb 2024 03:40 )    Color:  / Appearance:  / SG:  / pH:   Gluc: 118 mg/dL / Ketone:   / Bili:  / Urobili:    Blood:  / Protein:  / Nitrite:    Leuk Esterase:  / RBC:  / WBC    Sq Epi:  / Non Sq Epi:  / Bacteria:                           RADIOLOGY & ADDITIONAL TESTS:    Imaging Personally Reviewed:    Consultant(s) Notes Reviewed:      Care Discussed with Consultants/Other Providers:

## 2024-02-06 NOTE — PROGRESS NOTE ADULT - PROBLEM SELECTOR PLAN 1
[address]  Discharge- Bib Haro 1974, 53 y o  male MRN: [id number]  Unit/Bed#: ICU 05 Encounter: [id number]  Primary Care Provider: Wilver Martinez DO   Date and time admitted to hospital: 4/25/2023  3:24 AM    * PAF (paroxysmal atrial fibrillation) [facility])  Assessment & Plan  · Patient with history of paroxsymal atrial fibrillation  · He was previously on flecainide back in 2020 and was followed by interventional cardiology at [facility]     · Episodes of atrial fibrillation were precipitated by dehydration +/- stress  · He was planned to undergo ablation; however after review of Zio patch, he was not in atrial fibrillation, was noted to have several episodes of bradycardia-- at which time flecainide was stopped and Bystolic was decreased to 5 mg  · Patient presents today after feelings of palpitations with concerns for atrial fibrillation  · Of note, he is under new stressor of starting a new job yesterday   · Upon presentation to the ED, he was found to be in atrial fibrillation with rates 100-130's  · He was given 1L IVF and 5 mg of lopressor with subsequent decrease in HR to 70's  · He did not convert back to NSR; ED attending was discussing with patient about being admitted to the hospital-- patient had rise in blood pressure at this time, then apparent drop in HR to the 30's with subsequent drop in blood pressure to systolic in 03'A  · He was given 1 mg of atropine with HR increasing back to 80's-100's  · He was placed on cardizem gtt at 2 5mg and converted back to SR  · ECHO obtained and revealed EF 65%, mild MR, mild TR    · Per cardiology patient to be discharged with decreased Bystolic dose of 5 mg      Benign essential hypertension  Assessment & Plan  · Outpatient regimen consists of: bystolic 10 mg + amlodipine 2 5 mg  · Decrease Bystolic to 5 mg and follow up outpatient with cardiology     Hypokalemia  Assessment & Plan  · Repleted    Mixed hyperlipidemia  Assessment &
"Plan  · Continue statin on discharge              Medical Problems     Resolved Problems  Date Reviewed: 4/25/2023   None         Admission Date:   Admission Orders (From admission, onward)     Ordered        04/25/23 0628  INPATIENT ADMISSION  Once            04/25/23 0532  Place in Observation  Once,   Status:  Canceled                        Admitting Diagnosis: Bradycardia [R00 1]  PAF (paroxysmal atrial fibrillation) ([facility] [address] ) [I48 0]  Atrial fibrillation with rapid ventricular response ([facility] [address] ) [I48 91]    HPI: Annia Saint is a 53 y o  who presents with complaints of palpations with concern for atrial fibrillation  Patient has a past medical history of hypertension, PAF, and hyperlipidemia       Patient was previously followed by interventional cardiology back in August 2020  At which time he was planning to undergo an ablation  However, after review of zio patch, he did not have any episodes of a fib, but was noted to have episodes of bradycardia  At which time, flecainide was discontinued and his bystolic dose was decreased to 5 mg       Patient noted to have stressor of new job, with first day being yesterday  He presented to ED today and found to be in atrial fibrillation with rates 100-130s  He was given 1L IVF and 5 mg of lopressor  At which time his heart rate decreased to the 70's, but he remained in atrial fibrillation       The ED attending was discussing admission with the patient, at which time he became hypertensive, then had subsequent drop in heart to the 30's and loss of consciousness and then drop in blood pressure  He was administer 1 mg of atropine and given additional bolus of IVF   He regained consciousness immediately and had rise in HR to 80's to 100's      Patient to be admitted to SD1 for close hemodynamic monitoring\"    Procedures Performed:   Orders Placed This Encounter   Procedures   • Critical Care   • ED ECG Documentation Only   • ED ECG Documentation Only       Summary of Hospital "
Course: Patient was admitted to the SD unit and started on 2 5 mg of Cardizem  He converted back to SR about 1 hour after infusion was started  Cardizem was turned off  Patient remained in sinus rhythm with rates 30's-50's  Patient reports this is his baseline  Cardiology was called back to bedside as patient was requesting to be discharged  Plan is for additional f/u outpatient  Cardiology appointment with holter monitor  Patient to be started on aspirin, decrease Bystolic dosing to 5 mg, increase amlodipine to 5 mg if SBP remains > 130, and to return with any episodes of dizziness, lightness, or palpitations  Significant Findings, Care, Treatment and Services Provided:   4/25 PAF  4/25 1mg Atropine, 3L IVF bolus, cardizem infusion  4/25 ECHO  4/25 converted back to SR    Complications: n/a    Condition at Discharge: stable         Discharge instructions/Information to patient and family:   See after visit summary for information provided to patient and family  Provisions for Follow-Up Care:  See after visit summary for information related to follow-up care and any pertinent home health orders  PCP: Lavinia Orr DO    Disposition: Home    Planned Readmission: No    Discharge Statement   I spent 15 minutes discharging the patient  This time was spent on the day of discharge  I had direct contact with the patient on the day of discharge  Additional documentation is required if more than 30 minutes were spent on discharge  Discharge Medications:  See after visit summary for reconciled discharge medications provided to patient and family 
-CT orbit showed left sided pre-septal cellulitis, no evidence of orbital cellulitis or abscess. left mediport with tip at SVC, carious left sided third maxillary molar tooth.   -no pain with EOMI   -abx as per ID
-- stable  -- if no improvement in next 24 hr may need additional imaging
-CT orbit showed left sided pre-septal cellulitis, no evidence of orbital cellulitis or abscess. left mediport with tip at SVC, carious left sided third maxillary molar tooth.   -no pain with EOMI   -abx as per ID

## 2024-02-06 NOTE — PROGRESS NOTE ADULT - PROBLEM SELECTOR PROBLEM 5
Elevated alkaline phosphatase level
Anemia
Elevated alkaline phosphatase level

## 2024-02-06 NOTE — PROGRESS NOTE ADULT - ASSESSMENT
Ms. Dc is a 33 year old F with history of breast cancer (right breast s/p surgery including axillary node dissection, chemotherapy) who presents with swelling, inflammation  presents with redness and swelling of the right eye for the last 1-2 days.        Problem/Plan - 1:  ·  Problem: Preseptal cellulitis.   ·  Plan: -CT orbit showed left sided pre-septal cellulitis, no evidence of orbital cellulitis or abscess. left mediport with tip at SVC, carious left sided third maxillary molar tooth.   -no pain with EOMI   -will treat broadly with Vanc and Unasyn for now  -MRSA swab ordered   -blood culturex2   -narrow antibiotics accordingly.     Problem/Plan - 2:  ·  Problem: Breast cancer.   ·  Plan: s/p surgery and currently undergoing chemotherapy   -I called [facility] office at [phone] to request consult/discuss with oncologist overnight regarding filgrastim considering severe neutropenia. No one responded overnight.   -pain control with tylenol and oxycodone as needed.     Problem/Plan - 3:  ·  Problem: Neutropenia.   ·  Plan: neutrophil 1%, ANC 0  -gets GCSF monthly injections   -patient likely needs filgrastim   -No one from [facility] responded to call overnight.   -reverse isolation   -continue to monitor CBC daily   -patient at risk for mucor given immunosuppression and severe neutropenia- monitor for changes in sensation/progression of disease.   -Team in AM to call [facility]-number above to give GCSF.     Problem/Plan - 4:  ·  Problem: Thrombocytopenia.   ·  Plan: PLT count 9  -likely in the setting of chemotherapy  -consider IVIG and steroids if drop from baseline.   -obtain baseline PLT count from oncologist.     Problem/Plan - 5:  ·  Problem: Elevated alkaline phosphatase level.   ·  Plan: -Alk phos 336   -GGT in AM  -ABD US ordered to evaluate for GB path.     Problem/Plan - 6:  ·  Problem: Hyponatremia.   ·  Plan: -Na 130   -urine Na, urine osm and serum osm ordered  -continue to monitor BMP daily   -will not correct Na more than 6-8 meq in 24 hours.     Problem/Plan - 7:  ·  Problem: Anemia.   ·  Plan: H/H 6.6/19  -giving 1 unit of PRBC overnight   -CBC ordered stat  -Hb goal>7  -continue to monitor CBC closely  -hemolysis labs ordered.     Problem/Plan - 8:  ·  Problem: Thrush.   ·  Plan: -thrush present in mouth   -Nystatin swish and spit ordered  -no s/s of dysphagia concerning for candidal esophagitis   -continue to monitor.     Problem/Plan - 9:  ·  Problem: Need for prophylactic measure.   ·  Plan: DVT prophylaxis: SCD and OOB   GI prophylaxis: none   Diet: cardiac diet   Full code.

## 2024-02-06 NOTE — PROGRESS NOTE ADULT - PROBLEM SELECTOR PLAN 5
-Alk phos 336   monitor closely
- decide on transfusion based on stat cbc
-Alk phos 336   monitor closely

## 2024-02-06 NOTE — PROGRESS NOTE ADULT - PROBLEM SELECTOR PLAN 6
-Na 130   monitor closely

## 2024-02-06 NOTE — PROGRESS NOTE ADULT - ASSESSMENT
33 year old F with history of breast cancer (right breast s/p surgery including axillary node dissection, chemotherapy) presenting with 1 week of worsening left lower eyelid swelling with associated fever and eye redness/tearing, found to have preseptal cellulitis OS 2/2 chalazion.     #Preseptal Cellulitis, Left Side likely 2/2 chalazion OS  -Patient presenting with 1 week of worsening left lower eyelid swelling with associated tearing/eye redness and fever. Reports that symptoms started with a   "pimple" below medial lid margin which she scratched/ruptured. Symptoms now improving with IV antibiotics.   -VA 20/20 OS, IOP wnl, PERRLA OU with no rAPD  -No proptosis on exam. EOMs full with no pain/diplopia  -Noted on exam to have +2 indurated lower lid edema/erythema with concave erosion below medial lid margin with overlying scabbing. No drainage or fluctuance. Also with +2 injection and patchy CAROLINE OS  -CT showing left sided preseptal cellulitis. No orbital involvement  -switch to Maxitrol ointment 3x per day to left EYELID   - Warm compresses 3x per day left side.   - NO signs for orbital cellulitis  - Can switch to PO abx when medically able to be discharged.   - Follow up in optho clinic as below in 2 weeks.     #Dry Eye, Both Eyes  -Recommend artificial tears QID in both eyes    #Disc Hemorrhage vs Anomalous Vessel, Left Eye  -Found on exam to have possible disc hemorrhage vs anomalous vessel OS. However, disc margins sharp with no edema.   -likely iso of anemia and thrombocytopenia.   -No ophthalmic intervention. Will require outpatient follow-up upon discharge     Seen and discussed with Dr. rome    Outpatient follow-up: Patient should follow-up with his/her ophthalmologist or with [facility] Department of Ophthalmology at the address below     [address] [place] [address]. [address]  [place] [address]  [phone]

## 2024-02-06 NOTE — PROGRESS NOTE ADULT - ASSESSMENT
33-year-old female with triple negative breast cancer. Recently had AC with Keytruda. Here with infection and pancytopenia post chemo. She is getting Cefipime. She had Udenyca post chemo. WBC rising and should be better in next 1-2 days. Platelets lower but adequate. Transfuse for platelets < 10 or bleeding. Hgb adequate. care per medicine.

## 2024-02-06 NOTE — PROGRESS NOTE ADULT - SUBJECTIVE AND OBJECTIVE BOX
[facility] DEPARTMENT OF OPHTHALMOLOGY  ------------------------------------------------------------------------------  Samson Gottlieb MD PGY-3  ------------------------------------------------------------------------------    Interval History: No acute events overnight.     MEDICATIONS  (STANDING):  artificial tears (preservative free) Ophthalmic Solution 1 Drop(s) Both EYES four times a day  cefepime   IVPB 2000 milliGRAM(s) IV Intermittent every 8 hours  erythromycin   Ointment 1 Application(s) Left EYE three times a day  mupirocin 2% Ointment 1 Application(s) Topical two times a day  nystatin    Suspension 950454 Unit(s) Oral three times a day    MEDICATIONS  (PRN):  acetaminophen     Tablet .. 650 milliGRAM(s) Oral every 6 hours PRN Temp greater or equal to 38C (100.4F), Mild Pain (1 - 3)  melatonin 3 milliGRAM(s) Oral at bedtime PRN Insomnia      VITALS: T(C): 36.8 (02-06-24 @ 13:30)  T(F): 98.3 (02-06-24 @ 13:30), Max: 98.5 (02-06-24 @ 01:30)  HR: 99 (02-06-24 @ 13:30) (92 - 99)  BP: 119/85 (02-06-24 @ 13:30) (119/85 - 138/98)  RR:  (17 - 18)  SpO2:  (99% - 100%)  Wt(kg): --  General: AAO x 3, appropriate mood and affect    Ophthalmology Exam:  Visual acuity (sc): 20/20 OD, 20/20 OS  Pupils: PERRL OU, no APD  Ttono: 12 OD, 14 OS  Extraocular movements (EOMs): Full OU, no pain, no diplopia  Confrontational Visual Field (CVF): Full OU    Pen Light Exam (PLE)  External: Flat OU  Lids/Lashes/Lacrimal Ducts: +1-2 lower lid edema/erythema with associated erythema OS, no fluctuance. Indurated on palpation throughout lid. Concave erosion noted below lid margin medially with overlying scabbing. No purulence expressed. LL punctum edematous but with no discharge OS. Papillary rxn OS.   Sclera/Conjunctiva: W+Q OD. +2 injection with patchy CAROLINE OS and mild chemosis   Cornea: +2 PEE OU  Anterior Chamber: D+F OU    Iris: Flat OU  Lens: Cl OU    Fundus Exam: dilated with 1% tropicamide and 2.5% phenylephrine  Approval obtained from primary team for dilation  Patient aware that pupils can remained dilated for at least 4-6 hours  Exam performed with 20D lens  Consulted placed by Maria Luisa Merino at 2 PM with consent obtained to dilate     Vitreous: wnl OU  Disc, cup/disc: sharp and pink, 0.7 OD and 0.6 OS with nasal disc hemorrhage vs anomalous vessel  Macula: wnl OU  Vessels: wnl OU  Periphery: wnl OU

## 2024-02-06 NOTE — PROGRESS NOTE ADULT - PROBLEM SELECTOR PLAN 3
neutrophil 1%, ANC 0  -gets GCSF monthly injections   -patient likely needs filgrastim   -HEME ONC F/U
neutrophil 1%, ANC 0  -gets GCSF monthly injections   -patient likely needs filgrastim   -HEME ONC F/U
- Chemo induced  - s/p Udenyca on 1/25  - ordered CBC Stat --- am CBC not done by staff    s/w nursing staff and informed them of need for CBC and told them to contact NP with results and then NP can call me with questions
neutrophil 1%, ANC 0  -gets GCSF monthly injections   -patient likely needs filgrastim   -HEME ONC F/U
neutrophil 1%, ANC 0  -gets GCSF monthly injections   -patient likely needs filgrastim   -HEME ONC F/U

## 2024-02-07 VITALS
SYSTOLIC BLOOD PRESSURE: 106 MMHG | DIASTOLIC BLOOD PRESSURE: 67 MMHG | TEMPERATURE: 98 F | OXYGEN SATURATION: 100 % | RESPIRATION RATE: 17 BRPM | HEART RATE: 107 BPM

## 2024-02-07 LAB
ANION GAP SERPL CALC-SCNC: 14 MMOL/L — SIGNIFICANT CHANGE UP (ref 7–14)
ANISOCYTOSIS BLD QL: SLIGHT — SIGNIFICANT CHANGE UP
BASOPHILS # BLD AUTO: 0 K/UL — SIGNIFICANT CHANGE UP (ref 0–0.2)
BASOPHILS NFR BLD AUTO: 0 % — SIGNIFICANT CHANGE UP (ref 0–2)
BUN SERPL-MCNC: 8 MG/DL — SIGNIFICANT CHANGE UP (ref 7–23)
CALCIUM SERPL-MCNC: 9.6 MG/DL — SIGNIFICANT CHANGE UP (ref 8.4–10.5)
CHLORIDE SERPL-SCNC: 101 MMOL/L — SIGNIFICANT CHANGE UP (ref 98–107)
CO2 SERPL-SCNC: 21 MMOL/L — LOW (ref 22–31)
CREAT SERPL-MCNC: 0.49 MG/DL — LOW (ref 0.5–1.3)
CULTURE RESULTS: SIGNIFICANT CHANGE UP
CULTURE RESULTS: SIGNIFICANT CHANGE UP
EGFR: 128 ML/MIN/1.73M2 — SIGNIFICANT CHANGE UP
EOSINOPHIL # BLD AUTO: 0.04 K/UL — SIGNIFICANT CHANGE UP (ref 0–0.5)
EOSINOPHIL NFR BLD AUTO: 1.8 % — SIGNIFICANT CHANGE UP (ref 0–6)
GLUCOSE SERPL-MCNC: 101 MG/DL — HIGH (ref 70–99)
HCT VFR BLD CALC: 30.6 % — LOW (ref 34.5–45)
HGB BLD-MCNC: 10.8 G/DL — LOW (ref 11.5–15.5)
IANC: 0.96 K/UL — LOW (ref 1.8–7.4)
LYMPHOCYTES # BLD AUTO: 0.75 K/UL — LOW (ref 1–3.3)
LYMPHOCYTES # BLD AUTO: 34.8 % — SIGNIFICANT CHANGE UP (ref 13–44)
MAGNESIUM SERPL-MCNC: 1.9 MG/DL — SIGNIFICANT CHANGE UP (ref 1.6–2.6)
MANUAL SMEAR VERIFICATION: SIGNIFICANT CHANGE UP
MCHC RBC-ENTMCNC: 30.3 PG — SIGNIFICANT CHANGE UP (ref 27–34)
MCHC RBC-ENTMCNC: 35.3 GM/DL — SIGNIFICANT CHANGE UP (ref 32–36)
MCV RBC AUTO: 86 FL — SIGNIFICANT CHANGE UP (ref 80–100)
MONOCYTES # BLD AUTO: 0.27 K/UL — SIGNIFICANT CHANGE UP (ref 0–0.9)
MONOCYTES NFR BLD AUTO: 12.5 % — SIGNIFICANT CHANGE UP (ref 2–14)
NEUTROPHILS # BLD AUTO: 0.96 K/UL — LOW (ref 1.8–7.4)
NEUTROPHILS NFR BLD AUTO: 44.6 % — SIGNIFICANT CHANGE UP (ref 43–77)
PHOSPHATE SERPL-MCNC: 4.3 MG/DL — SIGNIFICANT CHANGE UP (ref 2.5–4.5)
PLAT MORPH BLD: NORMAL — SIGNIFICANT CHANGE UP
PLATELET # BLD AUTO: 32 K/UL — LOW (ref 150–400)
PLATELET COUNT - ESTIMATE: ABNORMAL
POTASSIUM SERPL-MCNC: 4.2 MMOL/L — SIGNIFICANT CHANGE UP (ref 3.5–5.3)
POTASSIUM SERPL-SCNC: 4.2 MMOL/L — SIGNIFICANT CHANGE UP (ref 3.5–5.3)
RBC # BLD: 3.56 M/UL — LOW (ref 3.8–5.2)
RBC # FLD: 16 % — HIGH (ref 10.3–14.5)
RBC BLD AUTO: ABNORMAL
SODIUM SERPL-SCNC: 136 MMOL/L — SIGNIFICANT CHANGE UP (ref 135–145)
SPECIMEN SOURCE: SIGNIFICANT CHANGE UP
SPECIMEN SOURCE: SIGNIFICANT CHANGE UP
VARIANT LYMPHS # BLD: 6.3 % — HIGH (ref 0–6)
WBC # BLD: 2.16 K/UL — LOW (ref 3.8–10.5)
WBC # FLD AUTO: 2.16 K/UL — LOW (ref 3.8–10.5)

## 2024-02-07 RX ORDER — NYSTATIN 500MM UNIT
5 POWDER (EA) MISCELLANEOUS
Qty: 60 | Refills: 0
Start: 2024-02-07 | End: 2024-02-10

## 2024-02-07 RX ORDER — CEPHALEXIN 500 MG
1 CAPSULE ORAL
Qty: 20 | Refills: 0
Start: 2024-02-07 | End: 2024-02-11

## 2024-02-07 RX ORDER — NEOMYCIN/POLYMYXIN B/DEXAMETHA 0.1 %
1 SUSPENSION, DROPS(FINAL DOSAGE FORM)(ML) OPHTHALMIC (EYE)
Qty: 1 | Refills: 0
Start: 2024-02-07 | End: 2024-02-11

## 2024-02-07 RX ADMIN — Medication 1 DROP(S): at 13:26

## 2024-02-07 RX ADMIN — Medication 1 DROP(S): at 05:15

## 2024-02-07 RX ADMIN — MUPIROCIN 1 APPLICATION(S): 20 OINTMENT TOPICAL at 05:15

## 2024-02-07 RX ADMIN — Medication 500000 UNIT(S): at 05:15

## 2024-02-07 RX ADMIN — Medication 1 APPLICATION(S): at 05:16

## 2024-02-07 RX ADMIN — CEFEPIME 100 MILLIGRAM(S): 1 INJECTION, POWDER, FOR SOLUTION INTRAMUSCULAR; INTRAVENOUS at 13:25

## 2024-02-07 RX ADMIN — Medication 500000 UNIT(S): at 13:26

## 2024-02-07 RX ADMIN — CEFEPIME 100 MILLIGRAM(S): 1 INJECTION, POWDER, FOR SOLUTION INTRAMUSCULAR; INTRAVENOUS at 05:16

## 2024-02-07 RX ADMIN — Medication 1 APPLICATION(S): at 13:25

## 2024-02-07 NOTE — DISCHARGE NOTE PROVIDER - PROVIDER TOKENS
PROVIDER:[TOKEN:[[id number]],FOLLOWUP:[1 week]],FREE:[LAST:[[facility]],PHONE:[phone]],FAX:[(   )    -],ADDRESS:[[address] [place] [address], [place] [address]],FOLLOWUP:[2 weeks]]

## 2024-02-07 NOTE — PROGRESS NOTE ADULT - REASON FOR ADMISSION
neutropenia, anemia, pre-septal cellulitis

## 2024-02-07 NOTE — DISCHARGE NOTE PROVIDER - CARE PROVIDER_API CALL
Gondal, Nasir Mahmood  Hematology  [address], [address] [place] [address]  Phone: [phone]  Fax: [phone]  Follow Up Time: 1 week    [facility],   [address], [address]  [place] [address]  Phone: [phone]  Fax: (   )    -  Follow Up Time: 2 weeks

## 2024-02-07 NOTE — PROGRESS NOTE ADULT - SUBJECTIVE AND OBJECTIVE BOX
MARLENY OWEN  MRN-[id number]    Patient is a 33y old  Female who presents with a chief complaint of neutropenia, anemia, pre-septal cellulitis (06 Feb 2024 15:14)      Review of System  REVIEW OF SYSTEMS      General:	Denies fatigue, fevers, chills, sweats, decreased appetite.    Skin/Breast: denies pruritis, rash  	  Ophthalmologic: no change in vision or blurring  	  HEENT	Denies dry mouth, oral sores, dysphagia,  change in hearing.    Respiratory and Thorax:  cough, sob, wheeze, hemoptysis  	  Cardiovascular:	no cp , palp, orthopnea    Gastrointestinal:	no n/v/d constipation    Genitourinary:	no dysuria of frequency, no hematuria, no flank pain    Musculoskeletal:	no bone or joint pain. no muscle aches.     Neurological:	no change in sensory or motor function. no headache. no weakness.     Psychiatric:	no depression, no anxiety, insomnia.     Hematology/Lymphatics:	no bleeding or bruising        Current Meds  MEDICATIONS  (STANDING):  artificial tears (preservative free) Ophthalmic Solution 1 Drop(s) Both EYES four times a day  cefepime   IVPB 2000 milliGRAM(s) IV Intermittent every 8 hours  erythromycin   Ointment 1 Application(s) Left EYE three times a day  mupirocin 2% Ointment 1 Application(s) Topical two times a day  nystatin    Suspension 905634 Unit(s) Oral three times a day    MEDICATIONS  (PRN):  acetaminophen     Tablet .. 650 milliGRAM(s) Oral every 6 hours PRN Temp greater or equal to 38C (100.4F), Mild Pain (1 - 3)  melatonin 3 milliGRAM(s) Oral at bedtime PRN Insomnia      Vitals  Vital Signs Last 24 Hrs  T(C): 36.6 (06 Feb 2024 21:30), Max: 36.8 (06 Feb 2024 09:30)  T(F): 97.9 (06 Feb 2024 21:30), Max: 98.3 (06 Feb 2024 13:30)  HR: 99 (06 Feb 2024 21:30) (89 - 99)  BP: 111/75 (06 Feb 2024 21:30) (111/75 - 133/85)  BP(mean): --  RR: 18 (06 Feb 2024 21:30) (17 - 19)  SpO2: 100% (06 Feb 2024 21:30) (98% - 100%)    Parameters below as of 06 Feb 2024 21:30  Patient On (Oxygen Delivery Method): room air        Physical Exam  PHYSICAL EXAM:      Constitutional: NAD    Eyes: PERRLA EOMI, anicteric sclera    Heent :No oral sores, no pharyngeal injection. moist mucosa.    Neck: supple, no jvd, no LAD    Respiratory: CTA b/l     Cardiovascular: s1s2, no m/g/r    Gastrointestinal: soft, nt, nd, + BS    Extremities: no c/c/e    Neurological:A&O x 3 moves all ext.    Skin: no rash on exposed skin    Lymph Nodes: no lymphadenopathy.              Lab  CBC Full  -  ( 07 Feb 2024 03:15 )  WBC Count : 2.16 K/uL  RBC Count : 3.56 M/uL  Hemoglobin : 10.8 g/dL  Hematocrit : 30.6 %  Platelet Count - Automated : 32 K/uL  Mean Cell Volume : 86.0 fL  Mean Cell Hemoglobin : 30.3 pg  Mean Cell Hemoglobin Concentration : 35.3 gm/dL  Auto Neutrophil # : x  Auto Lymphocyte # : x  Auto Monocyte # : x  Auto Eosinophil # : x  Auto Basophil # : x  Auto Neutrophil % : x  Auto Lymphocyte % : x  Auto Monocyte % : x  Auto Eosinophil % : x  Auto Basophil % : x    02-06    134<L>  |  100  |  6<L>  ----------------------------<  118<H>  3.9   |  19<L>  |  0.44<L>    Ca    9.4      06 Feb 2024 03:40  Phos  3.8     02-06  Mg     1.90     02-06          Rad:    Assessment/Plan   MARLENY OWEN  MRN-[id number]    Patient is a 33y old  Female who presents with a chief complaint of neutropenia, anemia, pre-septal cellulitis (06 Feb 2024 15:14)      Review of System    Resting comfortably    Current Meds  MEDICATIONS  (STANDING):  artificial tears (preservative free) Ophthalmic Solution 1 Drop(s) Both EYES four times a day  cefepime   IVPB 2000 milliGRAM(s) IV Intermittent every 8 hours  erythromycin   Ointment 1 Application(s) Left EYE three times a day  mupirocin 2% Ointment 1 Application(s) Topical two times a day  nystatin    Suspension 569801 Unit(s) Oral three times a day    MEDICATIONS  (PRN):  acetaminophen     Tablet .. 650 milliGRAM(s) Oral every 6 hours PRN Temp greater or equal to 38C (100.4F), Mild Pain (1 - 3)  melatonin 3 milliGRAM(s) Oral at bedtime PRN Insomnia      Vitals  Vital Signs Last 24 Hrs  T(C): 36.6 (06 Feb 2024 21:30), Max: 36.8 (06 Feb 2024 09:30)  T(F): 97.9 (06 Feb 2024 21:30), Max: 98.3 (06 Feb 2024 13:30)  HR: 99 (06 Feb 2024 21:30) (89 - 99)  BP: 111/75 (06 Feb 2024 21:30) (111/75 - 133/85)  BP(mean): --  RR: 18 (06 Feb 2024 21:30) (17 - 19)  SpO2: 100% (06 Feb 2024 21:30) (98% - 100%)    Parameters below as of 06 Feb 2024 21:30  Patient On (Oxygen Delivery Method): room air      PHYSICAL EXAM:    NAD    Lab  CBC Full  -  ( 07 Feb 2024 03:15 )  WBC Count : 2.16 K/uL  RBC Count : 3.56 M/uL  Hemoglobin : 10.8 g/dL  Hematocrit : 30.6 %  Platelet Count - Automated : 32 K/uL  Mean Cell Volume : 86.0 fL  Mean Cell Hemoglobin : 30.3 pg  Mean Cell Hemoglobin Concentration : 35.3 gm/dL  Auto Neutrophil # : x  Auto Lymphocyte # : x  Auto Monocyte # : x  Auto Eosinophil # : x  Auto Basophil # : x  Auto Neutrophil % : x  Auto Lymphocyte % : x  Auto Monocyte % : x  Auto Eosinophil % : x  Auto Basophil % : x    02-06    134<L>  |  100  |  6<L>  ----------------------------<  118<H>  3.9   |  19<L>  |  0.44<L>    Ca    9.4      06 Feb 2024 03:40  Phos  3.8     02-06  Mg     1.90     02-06          Rad:    Assessment/Plan

## 2024-02-07 NOTE — PROGRESS NOTE ADULT - PROVIDER SPECIALTY LIST ADULT
Infectious Disease
Infectious Disease
Cardiology
Heme/Onc
Heme/Onc
Ophthalmology
Heme/Onc
Heme/Onc
Infectious Disease
Cardiology
Cardiology
Internal Medicine

## 2024-02-07 NOTE — PROGRESS NOTE ADULT - ASSESSMENT
33-year-old female with triple negative breast cancer. Recently had AC with Keytruda.     1) Heme- pancytopenia- s/p chemo.  Neutropenia- She had Udenyca post chemo. WBC rising and should be better in next 1-2 days.   Thrombocytopenia- Platelets lower but adequate. Transfuse for platelets < 10 or bleeding.   Anemia- Hgb adequate. care per medicine.     2)_ Onc- further rx as outpt.    3) periorbital cellulitis.  -mgmt as per ID, optho, med.  33-year-old female with triple negative breast cancer. Recently had AC with Keytruda.     1) Heme- pancytopenia- s/p chemo.  -Neutropenia- She had Udenyca post chemo. WBC rising and should be better in next 1-2 days.   -Thrombocytopenia- Platelets lower but adequate. Transfuse for platelets < 10 or bleeding.   -Anemia- Hgb adequate. care per medicine.     2)_ Onc- further rx as outpt.    3) periorbital cellulitis.  -mgmt as per ID, optho, med.

## 2024-02-07 NOTE — DISCHARGE NOTE PROVIDER - NSDCMRMEDTOKEN_GEN_ALL_CORE_FT
Tylenol 500 mg oral tablet: 1 tab(s) orally 3 times a day for 7 days  Zofran ODT 8 mg oral tablet, disintegratin tab(s) orally every 8 hours as needed for  nausea   cephalexin 500 mg oral tablet: 1 tab(s) orally every 6 hours take through 24  Maxitrol 1 mg-3.5 mg-10,000 units/g ophthalmic ointment: 1 application in each affected eye 3 times a day  nystatin 100,000 units/mL oral suspension: 5 milliliter(s) orally 3 times a day swish and spit  Tylenol 500 mg oral tablet: 1 tab(s) orally 3 times a day for 7 days  Zofran ODT 8 mg oral tablet, disintegratin tab(s) orally every 8 hours as needed for  nausea

## 2024-02-07 NOTE — DISCHARGE NOTE PROVIDER - NSDCCPCAREPLAN_GEN_ALL_CORE_FT
PRINCIPAL DISCHARGE DIAGNOSIS  Diagnosis: Preseptal cellulitis  Assessment and Plan of Treatment: You were admitted for an eye infection. The eye doctor and infectious disease doctor saw you during your admisison. You were treated with IV antibiotics and then transitioned to oral anitbiotics (Keflex) please continue this medication til 2/12. You were also started on antibiotics ointment, please continue til 2/11.  Follow up with Opthomalogy within 2 weeks for further evaluation and treatment.      SECONDARY DISCHARGE DIAGNOSES  Diagnosis: Breast cancer  Assessment and Plan of Treatment: Follow up with your Oncologist Dr. Gondal within 1 week for further monitoring of your platelets    Diagnosis: Anemia  Assessment and Plan of Treatment: Your hemoglobin was low, you recieved 2 units of red blood cells while admitted, your hemoglobin increased to an acceptable level.  Follow up with your Oncologist Dr. Gondal within 1 week for further monitoring of your platelets    Diagnosis: Thrombocytopenia  Assessment and Plan of Treatment: You had a low platelets, you recieved 2 units of platelet transfusions. On discharge your platlets were acceptable. Follow up with your Oncologist Dr. Gondal within 1 week for further monitoring of your platelets.    Diagnosis: Neutropenia  Assessment and Plan of Treatment: You were found to have low nuetrophils, this was likely caused by your recent chemotherapy. Your neutrophils were monitored and slowly began to rise.  Follow up with your Oncologist Dr. Gondal within 1 week for further monitoring of your platelets     PRINCIPAL DISCHARGE DIAGNOSIS  Diagnosis: Preseptal cellulitis  Assessment and Plan of Treatment: You were admitted for an eye infection. A CT L preseptal cellulitis without orbital involvement The eye doctor and infectious disease doctor saw you during your admisison. You were treated with IV antibiotics and then transitioned to oral anitbiotics (Keflex) please continue this medication til 2/12. You were also started on antibiotics ointment, please continue til 2/11.  Follow up with Opthomalogy within 2 weeks for further evaluation and treatment.      SECONDARY DISCHARGE DIAGNOSES  Diagnosis: Breast cancer  Assessment and Plan of Treatment: Follow up with your Oncologist Dr. Gondal within 1 week for further monitoring of your platelets    Diagnosis: Anemia  Assessment and Plan of Treatment: Your hemoglobin was low, you recieved 2 units of red blood cells while admitted, your hemoglobin increased to an acceptable level.  Follow up with your Oncologist Dr. Gondal within 1 week for further monitoring of your platelets    Diagnosis: Thrombocytopenia  Assessment and Plan of Treatment: You had a low platelets, you recieved 2 units of platelet transfusions. On discharge your platlets were acceptable. Follow up with your Oncologist Dr. Gondal within 1 week for further monitoring of your platelets.    Diagnosis: Neutropenia  Assessment and Plan of Treatment: You were found to have low nuetrophils, this was likely caused by your recent chemotherapy. Your neutrophils were monitored and slowly began to rise.  Follow up with your Oncologist Dr. Gondal within 1 week for further monitoring of your platelets

## 2024-02-07 NOTE — DISCHARGE NOTE NURSING/CASE MANAGEMENT/SOCIAL WORK - PATIENT PORTAL LINK FT
You can access the FollowMyHealth Patient Portal offered by [facility] by registering at the following website: [email or web address]. By joining [facility]’s FollowMyHealth portal, you will also be able to view your health information using other applications (apps) compatible with our system.

## 2024-02-07 NOTE — DISCHARGE NOTE PROVIDER - HOSPITAL COURSE
33 year old F with history of breast cancer (right breast s/p surgery including axillary node dissection, chemotherapy) who presents with swelling, inflammation  presents with redness and swelling of the right eye for the last 1-2 days.     Preseptal cellulitis.   - CT orbit showed left sided pre-septal cellulitis, no evidence of orbital cellulitis or abscess. left mediport with tip at SVC, carious left sided third maxillary molar tooth.   - ID following, on IV cefepime, transition to Keflex for total 10 day treatment.   - Optho following, c/w eye drops    Breast cancer.   - S/p surgery and currently undergoing chemotherapy   - Called [facility] office at [phone] to request consult/discuss with oncologist overnight regarding filgrastim considering severe neutropenia.     Neutropenia.   - S/p Udenyca (peg-filgrastim) on 1/25/2024  - too early to dose additional Growth factor -- need to wait 14 days from prior injection  - neutrophil 1%, ANC 0, improved  - F/u Heme/onc outpatient      Thrombocytopenia.   - PBS without mention of schistocytes  - S/p 2U plts, plt stable for DC     Anemia.   - S/p 2U PRBC    Elevated alkaline phosphatase level.   -Alk phos 336   -ABD US normal    Thrush.   - Nystatin swish and spit     Case discussed with Dr. Pennington on 2/7/2024. Pt medically cleared for DC to home. Reviewed discharge medications with patient; All new medications requiring new prescription sent to pharmacy of patients choice. Reviewed need for prescription for previous home medication and new prescriptions sent if requested. Patient in agreement and understands.

## 2024-02-08 LAB
CULTURE RESULTS: SIGNIFICANT CHANGE UP
CULTURE RESULTS: SIGNIFICANT CHANGE UP
SPECIMEN SOURCE: SIGNIFICANT CHANGE UP
SPECIMEN SOURCE: SIGNIFICANT CHANGE UP

## 2024-02-09 LAB
CULTURE RESULTS: SIGNIFICANT CHANGE UP
SPECIMEN SOURCE: SIGNIFICANT CHANGE UP

## 2024-03-19 ENCOUNTER — INPATIENT (INPATIENT)
Facility: HOSPITAL | Age: 34
LOS: 4 days | Discharge: HOME | End: 2024-03-24
Attending: INTERNAL MEDICINE | Admitting: INTERNAL MEDICINE
Payer: MEDICAID

## 2024-03-19 VITALS
SYSTOLIC BLOOD PRESSURE: 109 MMHG | TEMPERATURE: 99 F | DIASTOLIC BLOOD PRESSURE: 78 MMHG | OXYGEN SATURATION: 100 % | RESPIRATION RATE: 14 BRPM | HEART RATE: 139 BPM

## 2024-03-19 DIAGNOSIS — Z95.828 PRESENCE OF OTHER VASCULAR IMPLANTS AND GRAFTS: Chronic | ICD-10-CM

## 2024-03-19 DIAGNOSIS — Z98.890 OTHER SPECIFIED POSTPROCEDURAL STATES: Chronic | ICD-10-CM

## 2024-03-19 DIAGNOSIS — C50.919 MALIGNANT NEOPLASM OF UNSPECIFIED SITE OF UNSPECIFIED FEMALE BREAST: Chronic | ICD-10-CM

## 2024-03-19 DIAGNOSIS — A41.9 SEPSIS, UNSPECIFIED ORGANISM: ICD-10-CM

## 2024-03-19 PROBLEM — D70.9 NEUTROPENIA, UNSPECIFIED: Chronic | Status: ACTIVE | Noted: 2024-02-03

## 2024-03-19 LAB
ALBUMIN SERPL ELPH-MCNC: 4.1 G/DL — SIGNIFICANT CHANGE UP (ref 3.3–5)
ALP SERPL-CCNC: 248 U/L — HIGH (ref 40–120)
ALT FLD-CCNC: 75 U/L — HIGH (ref 4–33)
ANION GAP SERPL CALC-SCNC: 10 MMOL/L — SIGNIFICANT CHANGE UP (ref 7–14)
ANISOCYTOSIS BLD QL: SLIGHT — SIGNIFICANT CHANGE UP
APTT BLD: 33.4 SEC — SIGNIFICANT CHANGE UP (ref 24.5–35.6)
AST SERPL-CCNC: 94 U/L — HIGH (ref 4–32)
BASE EXCESS BLDV CALC-SCNC: 0.2 MMOL/L — SIGNIFICANT CHANGE UP (ref -2–3)
BASOPHILS # BLD AUTO: 0 K/UL — SIGNIFICANT CHANGE UP (ref 0–0.2)
BASOPHILS NFR BLD AUTO: 1.3 % — SIGNIFICANT CHANGE UP (ref 0–2)
BILIRUB SERPL-MCNC: 0.8 MG/DL — SIGNIFICANT CHANGE UP (ref 0.2–1.2)
BLOOD GAS VENOUS COMPREHENSIVE RESULT: SIGNIFICANT CHANGE UP
BUN SERPL-MCNC: 18 MG/DL — SIGNIFICANT CHANGE UP (ref 7–23)
CALCIUM SERPL-MCNC: 9.4 MG/DL — SIGNIFICANT CHANGE UP (ref 8.4–10.5)
CHLORIDE BLDV-SCNC: 105 MMOL/L — SIGNIFICANT CHANGE UP (ref 96–108)
CHLORIDE SERPL-SCNC: 101 MMOL/L — SIGNIFICANT CHANGE UP (ref 98–107)
CO2 BLDV-SCNC: 26.7 MMOL/L — HIGH (ref 22–26)
CO2 SERPL-SCNC: 24 MMOL/L — SIGNIFICANT CHANGE UP (ref 22–31)
CREAT SERPL-MCNC: 0.75 MG/DL — SIGNIFICANT CHANGE UP (ref 0.5–1.3)
DACRYOCYTES BLD QL SMEAR: SLIGHT — SIGNIFICANT CHANGE UP
EGFR: 108 ML/MIN/1.73M2 — SIGNIFICANT CHANGE UP
EOSINOPHIL # BLD AUTO: 0 K/UL — SIGNIFICANT CHANGE UP (ref 0–0.5)
EOSINOPHIL NFR BLD AUTO: 0 % — SIGNIFICANT CHANGE UP (ref 0–6)
FLUAV AG NPH QL: SIGNIFICANT CHANGE UP
FLUBV AG NPH QL: SIGNIFICANT CHANGE UP
GAS PNL BLDV: 136 MMOL/L — SIGNIFICANT CHANGE UP (ref 136–145)
GLUCOSE BLDV-MCNC: 102 MG/DL — HIGH (ref 70–99)
GLUCOSE SERPL-MCNC: 101 MG/DL — HIGH (ref 70–99)
HCG SERPL-ACNC: <1 MIU/ML — SIGNIFICANT CHANGE UP
HCO3 BLDV-SCNC: 25 MMOL/L — SIGNIFICANT CHANGE UP (ref 22–29)
HCT VFR BLD CALC: 22.1 % — LOW (ref 34.5–45)
HCT VFR BLDA CALC: 23 % — LOW (ref 34.5–46.5)
HGB BLD CALC-MCNC: 7.5 G/DL — LOW (ref 11.7–16.1)
HGB BLD-MCNC: 7.6 G/DL — LOW (ref 11.5–15.5)
HYPOCHROMIA BLD QL: SLIGHT — SIGNIFICANT CHANGE UP
IANC: 0.04 K/UL — LOW (ref 1.8–7.4)
INR BLD: 0.99 RATIO — SIGNIFICANT CHANGE UP (ref 0.85–1.18)
LACTATE BLDV-MCNC: 0.9 MMOL/L — SIGNIFICANT CHANGE UP (ref 0.5–2)
LYMPHOCYTES # BLD AUTO: 0.25 K/UL — LOW (ref 1–3.3)
LYMPHOCYTES # BLD AUTO: 73.4 % — HIGH (ref 13–44)
MACROCYTES BLD QL: SLIGHT — SIGNIFICANT CHANGE UP
MCHC RBC-ENTMCNC: 31.4 PG — SIGNIFICANT CHANGE UP (ref 27–34)
MCHC RBC-ENTMCNC: 34.4 GM/DL — SIGNIFICANT CHANGE UP (ref 32–36)
MCV RBC AUTO: 91.3 FL — SIGNIFICANT CHANGE UP (ref 80–100)
MONOCYTES # BLD AUTO: 0.03 K/UL — SIGNIFICANT CHANGE UP (ref 0–0.9)
MONOCYTES NFR BLD AUTO: 10.1 % — SIGNIFICANT CHANGE UP (ref 2–14)
NEUTROPHILS # BLD AUTO: 0.03 K/UL — LOW (ref 1.8–7.4)
NEUTROPHILS NFR BLD AUTO: 10.1 % — LOW (ref 43–77)
PCO2 BLDV: 43 MMHG — SIGNIFICANT CHANGE UP (ref 39–52)
PH BLDV: 7.38 — SIGNIFICANT CHANGE UP (ref 7.32–7.43)
PLAT MORPH BLD: NORMAL — SIGNIFICANT CHANGE UP
PLATELET # BLD AUTO: 80 K/UL — LOW (ref 150–400)
PLATELET COUNT - ESTIMATE: ABNORMAL
PO2 BLDV: 29 MMHG — SIGNIFICANT CHANGE UP (ref 25–45)
POIKILOCYTOSIS BLD QL AUTO: SLIGHT — SIGNIFICANT CHANGE UP
POTASSIUM BLDV-SCNC: 4.3 MMOL/L — SIGNIFICANT CHANGE UP (ref 3.5–5.1)
POTASSIUM SERPL-MCNC: 4.5 MMOL/L — SIGNIFICANT CHANGE UP (ref 3.5–5.3)
POTASSIUM SERPL-SCNC: 4.5 MMOL/L — SIGNIFICANT CHANGE UP (ref 3.5–5.3)
PROT SERPL-MCNC: 7.7 G/DL — SIGNIFICANT CHANGE UP (ref 6–8.3)
PROTHROM AB SERPL-ACNC: 11.2 SEC — SIGNIFICANT CHANGE UP (ref 9.5–13)
RBC # BLD: 2.42 M/UL — LOW (ref 3.8–5.2)
RBC # FLD: 16.1 % — HIGH (ref 10.3–14.5)
RBC BLD AUTO: ABNORMAL
RSV RNA NPH QL NAA+NON-PROBE: SIGNIFICANT CHANGE UP
SAO2 % BLDV: 36.4 % — LOW (ref 67–88)
SARS-COV-2 RNA SPEC QL NAA+PROBE: SIGNIFICANT CHANGE UP
SODIUM SERPL-SCNC: 135 MMOL/L — SIGNIFICANT CHANGE UP (ref 135–145)
VARIANT LYMPHS # BLD: 5.1 % — SIGNIFICANT CHANGE UP (ref 0–6)
WBC # BLD: 0.34 K/UL — CRITICAL LOW (ref 3.8–10.5)
WBC # FLD AUTO: 0.34 K/UL — CRITICAL LOW (ref 3.8–10.5)

## 2024-03-19 PROCEDURE — 76705 ECHO EXAM OF ABDOMEN: CPT | Mod: 26

## 2024-03-19 PROCEDURE — 71045 X-RAY EXAM CHEST 1 VIEW: CPT | Mod: 26

## 2024-03-19 PROCEDURE — 99285 EMERGENCY DEPT VISIT HI MDM: CPT

## 2024-03-19 RX ORDER — SODIUM CHLORIDE 9 MG/ML
2000 INJECTION INTRAMUSCULAR; INTRAVENOUS; SUBCUTANEOUS ONCE
Refills: 0 | Status: COMPLETED | OUTPATIENT
Start: 2024-03-19 | End: 2024-03-19

## 2024-03-19 RX ORDER — LANOLIN ALCOHOL/MO/W.PET/CERES
3 CREAM (GRAM) TOPICAL AT BEDTIME
Refills: 0 | Status: DISCONTINUED | OUTPATIENT
Start: 2024-03-19 | End: 2024-03-24

## 2024-03-19 RX ORDER — CEFEPIME 1 G/1
2000 INJECTION, POWDER, FOR SOLUTION INTRAMUSCULAR; INTRAVENOUS EVERY 8 HOURS
Refills: 0 | Status: DISCONTINUED | OUTPATIENT
Start: 2024-03-19 | End: 2024-03-24

## 2024-03-19 RX ORDER — VANCOMYCIN HCL 1 G
1000 VIAL (EA) INTRAVENOUS ONCE
Refills: 0 | Status: COMPLETED | OUTPATIENT
Start: 2024-03-19 | End: 2024-03-19

## 2024-03-19 RX ORDER — ACETAMINOPHEN 500 MG
650 TABLET ORAL EVERY 6 HOURS
Refills: 0 | Status: DISCONTINUED | OUTPATIENT
Start: 2024-03-19 | End: 2024-03-24

## 2024-03-19 RX ORDER — CEFEPIME 1 G/1
1000 INJECTION, POWDER, FOR SOLUTION INTRAMUSCULAR; INTRAVENOUS ONCE
Refills: 0 | Status: COMPLETED | OUTPATIENT
Start: 2024-03-19 | End: 2024-03-19

## 2024-03-19 RX ORDER — VANCOMYCIN HCL 1 G
1000 VIAL (EA) INTRAVENOUS EVERY 12 HOURS
Refills: 0 | Status: DISCONTINUED | OUTPATIENT
Start: 2024-03-19 | End: 2024-03-21

## 2024-03-19 RX ORDER — ACETAMINOPHEN 500 MG
1000 TABLET ORAL ONCE
Refills: 0 | Status: COMPLETED | OUTPATIENT
Start: 2024-03-19 | End: 2024-03-19

## 2024-03-19 RX ADMIN — Medication 3 MILLIGRAM(S): at 23:57

## 2024-03-19 RX ADMIN — Medication 400 MILLIGRAM(S): at 15:00

## 2024-03-19 RX ADMIN — CEFEPIME 100 MILLIGRAM(S): 1 INJECTION, POWDER, FOR SOLUTION INTRAMUSCULAR; INTRAVENOUS at 22:16

## 2024-03-19 RX ADMIN — SODIUM CHLORIDE 2000 MILLILITER(S): 9 INJECTION INTRAMUSCULAR; INTRAVENOUS; SUBCUTANEOUS at 16:00

## 2024-03-19 RX ADMIN — SODIUM CHLORIDE 2000 MILLILITER(S): 9 INJECTION INTRAMUSCULAR; INTRAVENOUS; SUBCUTANEOUS at 14:57

## 2024-03-19 RX ADMIN — Medication 650 MILLIGRAM(S): at 23:22

## 2024-03-19 RX ADMIN — Medication 250 MILLIGRAM(S): at 16:04

## 2024-03-19 RX ADMIN — Medication 250 MILLIGRAM(S): at 22:16

## 2024-03-19 RX ADMIN — CEFEPIME 100 MILLIGRAM(S): 1 INJECTION, POWDER, FOR SOLUTION INTRAMUSCULAR; INTRAVENOUS at 15:00

## 2024-03-19 NOTE — ED ADULT NURSE REASSESSMENT NOTE - NS ED NURSE REASSESS COMMENT FT1
Pt returned back from ultrasound. Offers no complaints at this time. No signs of distress noted. Will continue to monitor.

## 2024-03-19 NOTE — ED PROVIDER NOTE - PHYSICAL EXAMINATION
GENERAL: NAD  HEENT:  Atraumatic  CHEST/LUNG: Chest rise equal bilaterally  HEART: Sinus tachycardia  ABDOMEN: Soft, Nontender, Nondistended  EXTREMITIES:  Extremities warm  PSYCH: A&Ox3  SKIN: No obvious rashes or lesions  NEUROLOGY: strength and sensation intact in all extremities.

## 2024-03-19 NOTE — ED PROVIDER NOTE - CLINICAL SUMMARY MEDICAL DECISION MAKING FREE TEXT BOX
32 y/o female w/ PMH breast cancer on chemotherapy (last chemo within 1 week ago) c/o 1 day history of fever, nausea, and NBNB vomiting. Pt is otherwise asymptomatic. Denies dizziness, chest pain, SOB, abdominal pain, dysuria, hematuria. Febrile, sinus tachycardic, hx of neutropenia, likely neutropenic sepsis. Sepsis labs, XR, UA/UC for UTI and admission for IV abxs.

## 2024-03-19 NOTE — ED PROVIDER NOTE - OBJECTIVE STATEMENT
32 y/o female w/ PMH breast cancer on chemotherapy (last chemo within 1 week ago) c/o 1 day history of fever, nausea, and NBNB vomiting. Pt is otherwise asymptomatic. Denies dizziness, chest pain, SOB, abdominal pain, dysuria, hematuria. 34 y/o female w/ PMH breast cancer on chemotherapy (last chemo within 1 week ago) c/o 1 day history of fever, nausea, and NBNB vomiting. Pt is otherwise asymptomatic. Denies dizziness, chest pain, SOB, abdominal pain, dysuria, hematuria.    Attendin-year-old female on chemotherapy for breast cancer, last chemo treatment on March 10 presents with fever and vomiting.  Patient has no pain complaints.  No dysuria.  No cough or congestion.  She has no skin rashes.  She was last here for a periorbital cellulitis and neutropenia last month.

## 2024-03-19 NOTE — ED PROVIDER NOTE - PROGRESS NOTE DETAILS
RUQ US ordered for elevated LFTs - no abnormalities so will admit at this time.     Jany Klein MD, PGY3

## 2024-03-19 NOTE — ED ADULT NURSE NOTE - OBJECTIVE STATEMENT
ER pt recently arrive from [place] 4 months ago, have been f/u at [facility] for breast CA recent Diagnose coming today for fever, cough, general malaise.   lungs clear, resp.. 18-20b/min, tachy. 120-130b/min on CM with NSTachy, IV to right AC 20g angio cath place, sepsis work up done, medicated as ordered.   pending dispo.     Sudha Johnson RN (covering BK)

## 2024-03-19 NOTE — ED ADULT TRIAGE NOTE - CHIEF COMPLAINT QUOTE
Pt arrives for fevers and vomiting at home. Pt on active chemo for break CA (3/13 last treatment). Patient denies chest pain sob n/v at this time. ibuprofen taken @11 Pt arrives for fevers and vomiting at home. Pt on active chemo for breast CA (3/13 last treatment). Patient denies chest pain sob n/v at this time. ibuprofen taken @11

## 2024-03-19 NOTE — ED ADULT NURSE NOTE - CHIEF COMPLAINT QUOTE
Pt arrives for fevers and vomiting at home. Pt on active chemo for breast CA (3/13 last treatment). Patient denies chest pain sob n/v at this time. ibuprofen taken @11

## 2024-03-19 NOTE — ED ADULT NURSE NOTE - NSFALLHARMRISKINTERV_ED_ALL_ED
Assistance OOB with selected safe patient handling equipment if applicable/Assistance with ambulation/Communicate risk of Fall with Harm to all staff, patient, and family/Encourage patient to sit up slowly, dangle for a short time, stand at bedside before walking/Monitor gait and stability/Orthostatic vital signs/Provide visual cue: red socks, yellow wristband, yellow gown, etc/Reinforce activity limits and safety measures with patient and family/Bed in lowest position, wheels locked, appropriate side rails in place/Call bell, personal items and telephone in reach/Instruct patient to call for assistance before getting out of bed/chair/stretcher/Non-slip footwear applied when patient is off stretcher/[place] to call system/Physically safe environment - no spills, clutter or unnecessary equipment/Purposeful Proactive Rounding/Room/bathroom lighting operational, light cord in reach

## 2024-03-20 DIAGNOSIS — D61.818 OTHER PANCYTOPENIA: ICD-10-CM

## 2024-03-20 DIAGNOSIS — Z29.9 ENCOUNTER FOR PROPHYLACTIC MEASURES, UNSPECIFIED: ICD-10-CM

## 2024-03-20 DIAGNOSIS — D70.9 NEUTROPENIA, UNSPECIFIED: ICD-10-CM

## 2024-03-20 LAB
A1C WITH ESTIMATED AVERAGE GLUCOSE RESULT: 5.8 % — HIGH (ref 4–5.6)
ALBUMIN SERPL ELPH-MCNC: 3.3 G/DL — SIGNIFICANT CHANGE UP (ref 3.3–5)
ALP SERPL-CCNC: 180 U/L — HIGH (ref 40–120)
ALT FLD-CCNC: 48 U/L — HIGH (ref 4–33)
ANION GAP SERPL CALC-SCNC: 11 MMOL/L — SIGNIFICANT CHANGE UP (ref 7–14)
APTT BLD: 32.3 SEC — SIGNIFICANT CHANGE UP (ref 24.5–35.6)
AST SERPL-CCNC: 34 U/L — HIGH (ref 4–32)
BASOPHILS # BLD AUTO: 0 K/UL — SIGNIFICANT CHANGE UP (ref 0–0.2)
BASOPHILS NFR BLD AUTO: 0 % — SIGNIFICANT CHANGE UP (ref 0–2)
BILIRUB SERPL-MCNC: 0.6 MG/DL — SIGNIFICANT CHANGE UP (ref 0.2–1.2)
BLD GP AB SCN SERPL QL: NEGATIVE — SIGNIFICANT CHANGE UP
BUN SERPL-MCNC: 10 MG/DL — SIGNIFICANT CHANGE UP (ref 7–23)
CALCIUM SERPL-MCNC: 8.7 MG/DL — SIGNIFICANT CHANGE UP (ref 8.4–10.5)
CHLORIDE SERPL-SCNC: 105 MMOL/L — SIGNIFICANT CHANGE UP (ref 98–107)
CHOLEST SERPL-MCNC: 159 MG/DL — SIGNIFICANT CHANGE UP
CO2 SERPL-SCNC: 20 MMOL/L — LOW (ref 22–31)
CREAT SERPL-MCNC: 0.59 MG/DL — SIGNIFICANT CHANGE UP (ref 0.5–1.3)
EGFR: 123 ML/MIN/1.73M2 — SIGNIFICANT CHANGE UP
EOSINOPHIL # BLD AUTO: 0.01 K/UL — SIGNIFICANT CHANGE UP (ref 0–0.5)
EOSINOPHIL NFR BLD AUTO: 2.8 % — SIGNIFICANT CHANGE UP (ref 0–6)
ESTIMATED AVERAGE GLUCOSE: 120 — SIGNIFICANT CHANGE UP
GLUCOSE SERPL-MCNC: 112 MG/DL — HIGH (ref 70–99)
HCT VFR BLD CALC: 20.2 % — CRITICAL LOW (ref 34.5–45)
HCT VFR BLD CALC: 20.4 % — CRITICAL LOW (ref 34.5–45)
HDLC SERPL-MCNC: 26 MG/DL — LOW
HGB BLD-MCNC: 6.6 G/DL — CRITICAL LOW (ref 11.5–15.5)
HGB BLD-MCNC: 6.7 G/DL — CRITICAL LOW (ref 11.5–15.5)
IANC: 0.01 K/UL — LOW (ref 1.8–7.4)
IMM GRANULOCYTES NFR BLD AUTO: 0 % — SIGNIFICANT CHANGE UP (ref 0–0.9)
INR BLD: 1.01 RATIO — SIGNIFICANT CHANGE UP (ref 0.85–1.18)
LIPID PNL WITH DIRECT LDL SERPL: 104 MG/DL — HIGH
LYMPHOCYTES # BLD AUTO: 0.25 K/UL — LOW (ref 1–3.3)
LYMPHOCYTES # BLD AUTO: 69.4 % — HIGH (ref 13–44)
MCHC RBC-ENTMCNC: 30 PG — SIGNIFICANT CHANGE UP (ref 27–34)
MCHC RBC-ENTMCNC: 30.6 PG — SIGNIFICANT CHANGE UP (ref 27–34)
MCHC RBC-ENTMCNC: 32.4 GM/DL — SIGNIFICANT CHANGE UP (ref 32–36)
MCHC RBC-ENTMCNC: 33.2 GM/DL — SIGNIFICANT CHANGE UP (ref 32–36)
MCV RBC AUTO: 90.6 FL — SIGNIFICANT CHANGE UP (ref 80–100)
MCV RBC AUTO: 94.4 FL — SIGNIFICANT CHANGE UP (ref 80–100)
MONOCYTES # BLD AUTO: 0.09 K/UL — SIGNIFICANT CHANGE UP (ref 0–0.9)
MONOCYTES NFR BLD AUTO: 25 % — HIGH (ref 2–14)
NEUTROPHILS # BLD AUTO: 0.01 K/UL — LOW (ref 1.8–7.4)
NEUTROPHILS NFR BLD AUTO: 2.8 % — LOW (ref 43–77)
NON HDL CHOLESTEROL: 133 MG/DL — HIGH
NRBC # BLD: 0 /100 WBCS — SIGNIFICANT CHANGE UP (ref 0–0)
NRBC # BLD: 0 /100 WBCS — SIGNIFICANT CHANGE UP (ref 0–0)
NRBC # FLD: 0 K/UL — SIGNIFICANT CHANGE UP (ref 0–0)
NRBC # FLD: 0 K/UL — SIGNIFICANT CHANGE UP (ref 0–0)
PLATELET # BLD AUTO: 56 K/UL — LOW (ref 150–400)
PLATELET # BLD AUTO: 60 K/UL — LOW (ref 150–400)
POTASSIUM SERPL-MCNC: 3.8 MMOL/L — SIGNIFICANT CHANGE UP (ref 3.5–5.3)
POTASSIUM SERPL-SCNC: 3.8 MMOL/L — SIGNIFICANT CHANGE UP (ref 3.5–5.3)
PROT SERPL-MCNC: 6.4 G/DL — SIGNIFICANT CHANGE UP (ref 6–8.3)
PROTHROM AB SERPL-ACNC: 11.3 SEC — SIGNIFICANT CHANGE UP (ref 9.5–13)
RBC # BLD: 2.16 M/UL — LOW (ref 3.8–5.2)
RBC # BLD: 2.23 M/UL — LOW (ref 3.8–5.2)
RBC # FLD: 15.9 % — HIGH (ref 10.3–14.5)
RBC # FLD: 16.2 % — HIGH (ref 10.3–14.5)
RH IG SCN BLD-IMP: POSITIVE — SIGNIFICANT CHANGE UP
RH IG SCN BLD-IMP: POSITIVE — SIGNIFICANT CHANGE UP
SODIUM SERPL-SCNC: 136 MMOL/L — SIGNIFICANT CHANGE UP (ref 135–145)
TRIGL SERPL-MCNC: 144 MG/DL — SIGNIFICANT CHANGE UP
VANCOMYCIN TROUGH SERPL-MCNC: 14 UG/ML — SIGNIFICANT CHANGE UP (ref 10–20)
WBC # BLD: 0.33 K/UL — CRITICAL LOW (ref 3.8–10.5)
WBC # BLD: 0.36 K/UL — CRITICAL LOW (ref 3.8–10.5)
WBC # FLD AUTO: 0.33 K/UL — CRITICAL LOW (ref 3.8–10.5)
WBC # FLD AUTO: 0.36 K/UL — CRITICAL LOW (ref 3.8–10.5)

## 2024-03-20 PROCEDURE — 99223 1ST HOSP IP/OBS HIGH 75: CPT

## 2024-03-20 PROCEDURE — 93010 ELECTROCARDIOGRAM REPORT: CPT

## 2024-03-20 PROCEDURE — 99232 SBSQ HOSP IP/OBS MODERATE 35: CPT

## 2024-03-20 RX ADMIN — Medication 650 MILLIGRAM(S): at 00:00

## 2024-03-20 RX ADMIN — CEFEPIME 100 MILLIGRAM(S): 1 INJECTION, POWDER, FOR SOLUTION INTRAMUSCULAR; INTRAVENOUS at 22:40

## 2024-03-20 RX ADMIN — Medication 250 MILLIGRAM(S): at 09:35

## 2024-03-20 RX ADMIN — CEFEPIME 100 MILLIGRAM(S): 1 INJECTION, POWDER, FOR SOLUTION INTRAMUSCULAR; INTRAVENOUS at 06:03

## 2024-03-20 RX ADMIN — CEFEPIME 100 MILLIGRAM(S): 1 INJECTION, POWDER, FOR SOLUTION INTRAMUSCULAR; INTRAVENOUS at 14:17

## 2024-03-20 RX ADMIN — Medication 650 MILLIGRAM(S): at 22:43

## 2024-03-20 NOTE — H&P ADULT - HISTORY OF PRESENT ILLNESS
Ms. Dc is 32 y/o female w/ PMH  breast cancer on chemotherapy (right breast s/p surgery including axillary node dissection, chemotherapy, last chemo within 1 week ago-03/10) c/o 1 day history of fever, nausea, and NBNB vomiting. Denies diarrhea, nasal congestion, sore throat, abdominal pain, chest pain, shortness of breath, no rashes, mucositis. Was on PO antibiotic PPX. Labs were sig fro the following H/H 7.6/22.1 (10.8/30.6), PLT decreased, ANC 0.03, Alk phos 248, PLT-decreased, AST/ALT 94/75, LA 0.9. ABD US shows the following 3 mm CBD dilation. She has a history of GCSF infusions with her chemotherapy. Denies sick contacts or travel. Prior Taxol/Carboplatin and received Keytruda/Adriamycin/Cytoxan, uncertain what most recent regimen given was      She was recently admitted to [facility] for periorbital cellulitis and neutropenic fever.

## 2024-03-20 NOTE — H&P ADULT - PROBLEM SELECTOR PLAN 2
-likely secondary to chemotherapy   -neutropenic  -consider filgrastim in the AM given low ANC. -likely secondary to chemotherapy   -neutropenic  -heme onc consult in AM per primary team  -consider filgrastim in the AM given low ANC.

## 2024-03-20 NOTE — H&P ADULT - NSHPLABSRESULTS_GEN_ALL_CORE
7.6    0.34  )-----------( 80       ( 19 Mar 2024 14:38 )             22.1     135  |  101  |  18  ----------------------------<  101<H>     03-19  4.5   |  24  |  0.75    Ca    9.4      19 Mar 2024 14:38    TPro  7.7  /  Alb  4.1  /  TBili  0.8  /  DBili  x   /  AST  94<H>  /  ALT  75<H>  /  AlkPhos  248<H>  03-19    PT/INR: 11.2/0.99 (03-19-24 @ 14:38)  PTT: 33.4 (03-19-24 @ 14:38)      14:38 - VBG - pH: 7.38  | pCO2: 43    | pO2: 29    | Lactate: 0.9

## 2024-03-20 NOTE — H&P ADULT - NSHPPHYSICALEXAM_GEN_ALL_CORE
Vital Signs Last 24 Hrs  T(C): 36.7 (20 Mar 2024 04:11), Max: 39.4 (19 Mar 2024 14:28)  T(F): 98.1 (20 Mar 2024 04:11), Max: 102.9 (19 Mar 2024 14:28)  HR: 104 (20 Mar 2024 04:11) (104 - 139)  BP: 100/63 (20 Mar 2024 04:11) (99/63 - 114/75)  BP(mean): --  RR: 17 (20 Mar 2024 04:11) (14 - 18)  SpO2: 100% (20 Mar 2024 04:11) (99% - 100%)    Parameters below as of 20 Mar 2024 04:11  Patient On (Oxygen Delivery Method): room air        CONSTITUTIONAL: Well-groomed, in no apparent distress  EYES: No conjunctival or scleral injection, non-icteric; PERRLA and symmetric  ENMT: No external nasal lesions; nasal mucosa not inflamed;  oral mucosa with moist membranes  NECK: Trachea midline without palpable neck mass; thyroid not enlarged and non-tender  RESPIRATORY: Breathing comfortably;  lungs CTA without wheeze/rhonchi/rales  CARDIOVASCULAR: +S1S2, RRR, no M/G/R; no lower extremity edema bilaterally   GASTROINTESTINAL: No palpable masses or tenderness, +BS throughout, no rebound/guarding; no hepatosplenomegaly; no hernia palpated  MUSCULOSKELETAL: no digital clubbing or cyanosis;  normal strength and tone of extremities  SKIN: No rashes or ulcers noted; no subcutaneous nodules or induration palpable  NEUROLOGIC: CN II-XII intact; sensation intact in LEs b/l to light touch  PSYCHIATRIC: A+O x 3; mood and affect appropriate; appropriate insight and judgment

## 2024-03-20 NOTE — H&P ADULT - NSICDXPASTSURGICALHX_GEN_ALL_CORE_FT
PAST SURGICAL HISTORY:  Breast cancer metastasized to axillary lymph node     H/O breast surgery     Port-A-Cath in place

## 2024-03-20 NOTE — H&P ADULT - PROBLEM SELECTOR PLAN 1
-no source identifiable   - Cefepime 2g q 8 and vanco 1g q 12, vanc trough before 4th dose   - F/U BCXs x 2-pending, negative so far.   - Monitor for further signs/symptoms infection  - If fevers not resolving or clinically worsening check CT Chest, CT A/P (with contrast) recommended per ID  -ID consulted, and following  -transaminitis- RUQ US showing CBD dilated to 3 mm, otherwise wnl.

## 2024-03-20 NOTE — DIETITIAN INITIAL EVALUATION ADULT - PERTINENT LABORATORY DATA
03-20    136  |  105  |  10  ----------------------------<  112<H>  3.8   |  20<L>  |  0.59    Ca    8.7      20 Mar 2024 06:36    TPro  6.4  /  Alb  3.3  /  TBili  0.6  /  DBili  x   /  AST  34<H>  /  ALT  48<H>  /  AlkPhos  180<H>  03-20  A1C with Estimated Average Glucose Result: 5.8 % (03-20-24 @ 06:36)  A1C with Estimated Average Glucose Result: 5.4 % (02-03-24 @ 05:35)   Ear Star Wedge Flap Text: The defect edges were debeveled with a #15 blade scalpel.  Given the location of the defect and the proximity to free margins (helical rim) an ear star wedge flap was deemed most appropriate.  Using a sterile surgical marker, the appropriate flap was drawn incorporating the defect and placing the expected incisions between the helical rim and antihelix where possible.  The area thus outlined was incised through and through with a #15 scalpel blade.

## 2024-03-20 NOTE — DIETITIAN INITIAL EVALUATION ADULT - ORAL INTAKE PTA/DIET HISTORY
Patient is Shima speaking,  services with agents#[id number] and #[id number] are used during nutrition interview. Patient does not know her height and usual body weight. Patient states that she does not follow halal diet, is a lactovegetarian. Patient states that she has poor appetite and po intake prior to admission due to vomiting (unknown time frame). Patient has no known food allergies.

## 2024-03-20 NOTE — DIETITIAN INITIAL EVALUATION ADULT - NS FNS DIET ORDER
Diet, Regular:   DASH/TLC {Sodium & Cholesterol Restricted} (DASH)  Low Sodium  Halal (03-19-24 @ 20:45)

## 2024-03-20 NOTE — DIETITIAN INITIAL EVALUATION ADULT - ADD RECOMMEND
1. Nutrition department to provide Orgain 11oz 2x/day (440kcal, 32gm pro) for nutrient support.   2. Monitor weight, labs, po intake and tolerance, bowel movement, skin integrity.   3. Encourage PO intake and honor food preferences as able.

## 2024-03-20 NOTE — DIETITIAN INITIAL EVALUATION ADULT - ETIOLOGY
metabolic demand decrease ability to consume sufficient energy in settings of increased nutrient needs

## 2024-03-20 NOTE — H&P ADULT - ASSESSMENT
Ms. Dc is 34 y/o female w/ PMH  breast cancer on chemotherapy (right breast s/p surgery including axillary node dissection, chemotherapy, last chemo within 1 week ago-03/10) c/o 1 day history of fever, nausea, and NBNB vomiting concerning for neutropenic fever.

## 2024-03-20 NOTE — PROGRESS NOTE ADULT - ASSESSMENT
32 yo F with history of breast CA on chemotherapy--last round 1 week ago, was seen in hospital 1 month ago with redness of eye and neutropenic fever following chemo, now here with fevers  Fever, neutropenia  Chills, fevers, no other focal symptoms infection, no mucositis  L sided mediport  Prior Taxol/Carboplatin and received Keytruda/Adriamycin/Cytoxan, uncertain what most recent regimen given was  CXR clear  RUQ USG WNL  LFTs elevated  Most likely primary neutropenic fever without defined source, but monitor closely  Appears improved today, still fevers  Overall, Neutropenic fever, immunocompromised, chemo  - Cefepime 2g q 8  - Vanco 1g q 12 (monitor levels)  - F/U BCXs x 2  - Monitor for further signs/symptoms infection  - If fevers not resolving or clinically worsening check CT Chest, CT A/P (with contrast)--can hold if continues to improve  - Trend CBC/ANC  - Next steps based on clinical progression/targeted signs infection    Valentino Sofia MD  Contact on TEAMS messaging from 9am - 5pm  From 5pm-9am, on weekends, or if no response call [phone]

## 2024-03-20 NOTE — DIETITIAN INITIAL EVALUATION ADULT - PERTINENT MEDS FT
MEDICATIONS  (STANDING):  cefepime   IVPB 2000 milliGRAM(s) IV Intermittent every 8 hours  vancomycin  IVPB 1000 milliGRAM(s) IV Intermittent every 12 hours    MEDICATIONS  (PRN):  acetaminophen     Tablet .. 650 milliGRAM(s) Oral every 6 hours PRN Mild Pain (1 - 3)  melatonin 3 milliGRAM(s) Oral at bedtime PRN Insomnia

## 2024-03-20 NOTE — H&P ADULT - PROBLEM SELECTOR PLAN 3
DVT prophylaxis: SCD bilaterally and OOB given thrombocytopenia, fall precautions   GI prophylaxis: none   Diet: regular diet, halal   Full code

## 2024-03-20 NOTE — DIETITIAN INITIAL EVALUATION ADULT - SIGNS/SYMPTOMS
noted significant weight loss of -2.2kg/-5%BW x1 month and -4.1kg/-8.9%BW x 3 months. breast cancer on chemotherapy.

## 2024-03-20 NOTE — H&P ADULT - NSHPREVIEWOFSYSTEMS_GEN_ALL_CORE
Review of Systems:   CONSTITUTIONAL: fevers+  EYES: no vision changes  ENMT:  No difficulty hearing, no nasal congestion.   RESPIRATORY: No SOB. No cough, +chills  CARDIOVASCULAR: No chest pain  GASTROINTESTINAL: No abdominal or epigastric pain. + nausea, + vomiting, no hematemesis; No diarrhea.  No melena or hematochezia.  GENITOURINARY: No dysuria, no urinary frequency  NEUROLOGICAL: +headaches, no numbness, no neck stiffness   SKIN: No itching or rashes.   ENDOCRINE: No heat or cold intolerance; No hair loss  MUSCULOSKELETAL: no back pain  PSYCHIATRIC: + difficulty sleeping- in hospital

## 2024-03-20 NOTE — DIETITIAN INITIAL EVALUATION ADULT - OTHER INFO
Ms. Dc is 32 y/o female w/ PMH  breast cancer on chemotherapy (right breast s/p surgery including axillary node dissection, chemotherapy, last chemo within 1 week ago-03/10) c/o 1 day history of fever, nausea, and NBNB vomiting concerning for neutropenic fever, per chart.     Patient states that her appetite improved during hospital stay. Per RN flow sheet, noted patient has 1 intake of 51-75% documented. No reports of any difficulty chewing or swallowing noted at this time. Patient denies any nausea, vomiting, diarrhea, constipation during visit. Reports last bowel movement 3/20. Current weight: 41.7kg/92lbs (3/19, per RN flow sheet). Height indicated in chart: 157.5cm, accuracy questionable. Per Nicholas FOSTER, height: 147.3cm, weight history: 43.9kg (2/12), 45.8kg (12/18/2023). Noted patient has significant weight loss of -2.2kg/-5%BW x1 month and -4.1kg/-8.9%BW x 3 months. Continue to monitor weight trend. Discussed oral nutritional supplements, small frequent meals with patient during visit.  Ms. Dc is 32 y/o female w/ PMH  breast cancer on chemotherapy (right breast s/p surgery including axillary node dissection, chemotherapy, last chemo within 1 week ago-03/10) c/o 1 day history of fever, nausea, and NBNB vomiting concerning for neutropenic fever, per chart.     Patient states that her appetite improved during hospital stay. Per RN flow sheet, noted patient has 1 intake of 51-75% documented. No reports of any difficulty chewing or swallowing noted at this time. Patient denies any nausea, vomiting, diarrhea, constipation during visit. Reports last bowel movement 3/20. Current weight: 41.7kg/92lbs (3/19, per RN flow sheet). Height indicated in chart: 157.5cm, accuracy questionable. Per Nicholas FOSTER, height: 147.3cm, weight history: 43.9kg (2/12), 45.8kg (12/18/2023). Noted patient has significant weight loss of -2.2kg/-5%BW x1 month and -4.1kg/-8.9%BW x 3 months. Patient is at risk for malnutrition. Continue to monitor weight trend. Discussed oral nutritional supplements, small frequent meals with patient during visit.

## 2024-03-21 LAB
ANION GAP SERPL CALC-SCNC: 11 MMOL/L — SIGNIFICANT CHANGE UP (ref 7–14)
BUN SERPL-MCNC: 8 MG/DL — SIGNIFICANT CHANGE UP (ref 7–23)
CALCIUM SERPL-MCNC: 9.3 MG/DL — SIGNIFICANT CHANGE UP (ref 8.4–10.5)
CHLORIDE SERPL-SCNC: 102 MMOL/L — SIGNIFICANT CHANGE UP (ref 98–107)
CO2 SERPL-SCNC: 21 MMOL/L — LOW (ref 22–31)
CREAT SERPL-MCNC: 0.56 MG/DL — SIGNIFICANT CHANGE UP (ref 0.5–1.3)
EGFR: 124 ML/MIN/1.73M2 — SIGNIFICANT CHANGE UP
GLUCOSE SERPL-MCNC: 92 MG/DL — SIGNIFICANT CHANGE UP (ref 70–99)
HCT VFR BLD CALC: 24.7 % — LOW (ref 34.5–45)
HCT VFR BLD CALC: 25.9 % — LOW (ref 34.5–45)
HGB BLD-MCNC: 8.6 G/DL — LOW (ref 11.5–15.5)
HGB BLD-MCNC: 9 G/DL — LOW (ref 11.5–15.5)
MAGNESIUM SERPL-MCNC: 1.8 MG/DL — SIGNIFICANT CHANGE UP (ref 1.6–2.6)
MCHC RBC-ENTMCNC: 29.8 PG — SIGNIFICANT CHANGE UP (ref 27–34)
MCHC RBC-ENTMCNC: 29.9 PG — SIGNIFICANT CHANGE UP (ref 27–34)
MCHC RBC-ENTMCNC: 34.7 GM/DL — SIGNIFICANT CHANGE UP (ref 32–36)
MCHC RBC-ENTMCNC: 34.8 GM/DL — SIGNIFICANT CHANGE UP (ref 32–36)
MCV RBC AUTO: 85.8 FL — SIGNIFICANT CHANGE UP (ref 80–100)
MCV RBC AUTO: 85.8 FL — SIGNIFICANT CHANGE UP (ref 80–100)
NRBC # BLD: 0 /100 WBCS — SIGNIFICANT CHANGE UP (ref 0–0)
NRBC # BLD: 0 /100 WBCS — SIGNIFICANT CHANGE UP (ref 0–0)
NRBC # FLD: 0 K/UL — SIGNIFICANT CHANGE UP (ref 0–0)
NRBC # FLD: 0 K/UL — SIGNIFICANT CHANGE UP (ref 0–0)
PHOSPHATE SERPL-MCNC: 3 MG/DL — SIGNIFICANT CHANGE UP (ref 2.5–4.5)
PLATELET # BLD AUTO: 47 K/UL — LOW (ref 150–400)
PLATELET # BLD AUTO: 56 K/UL — LOW (ref 150–400)
POTASSIUM SERPL-MCNC: 3.5 MMOL/L — SIGNIFICANT CHANGE UP (ref 3.5–5.3)
POTASSIUM SERPL-SCNC: 3.5 MMOL/L — SIGNIFICANT CHANGE UP (ref 3.5–5.3)
RBC # BLD: 2.88 M/UL — LOW (ref 3.8–5.2)
RBC # BLD: 3.02 M/UL — LOW (ref 3.8–5.2)
RBC # FLD: 16 % — HIGH (ref 10.3–14.5)
RBC # FLD: 16.5 % — HIGH (ref 10.3–14.5)
SODIUM SERPL-SCNC: 134 MMOL/L — LOW (ref 135–145)
VANCOMYCIN TROUGH SERPL-MCNC: 13 UG/ML — SIGNIFICANT CHANGE UP (ref 10–20)
WBC # BLD: 0.52 K/UL — CRITICAL LOW (ref 3.8–10.5)
WBC # BLD: 0.55 K/UL — CRITICAL LOW (ref 3.8–10.5)
WBC # FLD AUTO: 0.52 K/UL — CRITICAL LOW (ref 3.8–10.5)
WBC # FLD AUTO: 0.55 K/UL — CRITICAL LOW (ref 3.8–10.5)

## 2024-03-21 PROCEDURE — 99232 SBSQ HOSP IP/OBS MODERATE 35: CPT

## 2024-03-21 RX ORDER — BENZOCAINE AND MENTHOL 5; 1 G/100ML; G/100ML
1 LIQUID ORAL THREE TIMES A DAY
Refills: 0 | Status: DISCONTINUED | OUTPATIENT
Start: 2024-03-21 | End: 2024-03-24

## 2024-03-21 RX ADMIN — BENZOCAINE AND MENTHOL 1 LOZENGE: 5; 1 LIQUID ORAL at 09:08

## 2024-03-21 RX ADMIN — CEFEPIME 100 MILLIGRAM(S): 1 INJECTION, POWDER, FOR SOLUTION INTRAMUSCULAR; INTRAVENOUS at 14:33

## 2024-03-21 RX ADMIN — CEFEPIME 100 MILLIGRAM(S): 1 INJECTION, POWDER, FOR SOLUTION INTRAMUSCULAR; INTRAVENOUS at 06:45

## 2024-03-21 RX ADMIN — BENZOCAINE AND MENTHOL 1 LOZENGE: 5; 1 LIQUID ORAL at 15:43

## 2024-03-21 RX ADMIN — CEFEPIME 100 MILLIGRAM(S): 1 INJECTION, POWDER, FOR SOLUTION INTRAMUSCULAR; INTRAVENOUS at 22:40

## 2024-03-21 RX ADMIN — Medication 250 MILLIGRAM(S): at 00:23

## 2024-03-21 RX ADMIN — Medication 250 MILLIGRAM(S): at 11:34

## 2024-03-21 NOTE — CONSULT NOTE ADULT - ASSESSMENT
33-year-old female with triple negative breast cancer undergoing neoadjuvant therapy. Recently had her 3/4 dose of AC (dose reduced.) She had Neulasta support. Still admitted with neutropenic fevers and pancytopenia. Getting iv antibiotics and ID is following. She is non toxic appearing. No G-CSF as she already had long acting G-CSF. WBC appears to have nadired and is now slowly rising. Expect that the WBC will improve in next 2-3 days. Hgb and platelets adequate at this time and no transfusion needed. Supportive management from hem/onc perspective. 
32 yo F with history of breast CA on chemotherapy--last round 1 week ago, was seen in hospital 1 month ago with redness of eye and neutropenic fever following chemo, now here with fevers  Fever, neutropenia  Chills, fevers, no other focal symptoms infection, no mucositis  L sided mediport  Prior Taxol/Carboplatin and received Keytruda/Adriamycin/Cytoxan, uncertain what most recent regimen given was  CXR clear  RUQ USG WNL  LFTs elevated  Most likely primary neutropenic fever without defined source, but monitor closely  Overall, Neutropenic fever, immunocompromised, chemo  - Cefepime 2g q 8  - Vanco 1g q 12 (monitor levels)  - F/U BCXs x 2  - Monitor for further signs/symptoms infection  - If fevers not resolving or clinically worsening check CT Chest, CT A/P (with contrast)  - Trend CBC/ANC  - Next steps based on clinical progression/targeted signs infection    Valentino Sofia MD  Contact on TEAMS messaging from 9am - 5pm  From 5pm-9am, on weekends, or if no response call [phone]

## 2024-03-21 NOTE — PROGRESS NOTE ADULT - ASSESSMENT
Ms. Dc is 32 y/o female w/ PMH  breast cancer on chemotherapy (right breast s/p surgery including axillary node dissection, chemotherapy, last chemo within 1 week ago-03/10) c/o 1 day history of fever, nausea, and NBNB vomiting concerning for neutropenic fever.

## 2024-03-21 NOTE — CONSULT NOTE ADULT - SUBJECTIVE AND OBJECTIVE BOX
Chief Complaint:  Patient is a 33y old  Female who presents with a chief complaint of neutropenic fever (21 Mar 2024 11:36)      HPI: Patient of Dr. Gondal and seeing patient in coverage. She has triple negative breast cancer and is undergoing neoadjuvant treatment. She had AC with Neulasta a few days ago and she is admitted with neutropenic fever and pancytopenia. ID is following and she is getting iv antibiotics.      Medications:  acetaminophen     Tablet .. 650 milliGRAM(s) Oral every 6 hours PRN  benzocaine/menthol Lozenge 1 Lozenge Oral three times a day PRN  cefepime   IVPB 2000 milliGRAM(s) IV Intermittent every 8 hours  melatonin 3 milliGRAM(s) Oral at bedtime PRN        Allergies:  No Known Allergies    FAMILY HISTORY:  No pertinent family history in first degree relatives      PAST MEDICAL & SURGICAL HISTORY:  Breast cancer      Neutropenia      H/O breast surgery      Breast cancer metastasized to axillary lymph node      Port-A-Cath in place    REVIEW OF SYSTEMS      General: eating. No fever.    Skin/Breast: No itch  	  ENMT:	No nosebleeds    Respiratory and Thorax: No SOB or cough  	  Cardiovascular:	No CP    Gastrointestinal:	No N/V/D or abd pain    Genitourinary:	No hematuria    Musculoskeletal:	 No back pain    Neurological:	No HA or dizziness.       Vitals:  Vital Signs Last 24 Hrs  T(C): 36.7 (21 Mar 2024 20:52), Max: 36.9 (21 Mar 2024 11:04)  T(F): 98 (21 Mar 2024 20:52), Max: 98.4 (21 Mar 2024 11:04)  HR: 83 (21 Mar 2024 20:52) (83 - 113)  BP: 116/73 (21 Mar 2024 20:52) (109/67 - 116/73)  BP(mean): --  RR: 17 (21 Mar 2024 20:52) (16 - 18)  SpO2: 100% (21 Mar 2024 20:52) (100% - 100%)    Parameters below as of 21 Mar 2024 20:52  Patient On (Oxygen Delivery Method): room air        Pex:  alert NAD  EOMI anicteric sclera  Neck No LNA  Cv s1 S2 RRR  Lungs clear B/L  abd soft NT ND +BS  No LE edema or tenderness    Labs:                        9.0    0.55  )-----------( 47       ( 21 Mar 2024 06:01 )             25.9     CBC Full  -  ( 21 Mar 2024 06:01 )  WBC Count : 0.55 K/uL  RBC Count : 3.02 M/uL  Hemoglobin : 9.0 g/dL  Hematocrit : 25.9 %  Platelet Count - Automated : 47 K/uL  Mean Cell Volume : 85.8 fL  Mean Cell Hemoglobin : 29.8 pg  Mean Cell Hemoglobin Concentration : 34.7 gm/dL  Auto Neutrophil # : x  Auto Lymphocyte # : x  Auto Monocyte # : x  Auto Eosinophil # : x  Auto Basophil # : x  Auto Neutrophil % : x  Auto Lymphocyte % : x  Auto Monocyte % : x  Auto Eosinophil % : x  Auto Basophil % : x    03-21    134<L>  |  102  |  8   ----------------------------<  92  3.5   |  21<L>  |  0.56    Ca    9.3      21 Mar 2024 06:01  Phos  3.0     03-21  Mg     1.80     03-21    TPro  6.4  /  Alb  3.3  /  TBili  0.6  /  DBili  x   /  AST  34<H>  /  ALT  48<H>  /  AlkPhos  180<H>  03-20    PT/INR - ( 20 Mar 2024 06:36 )   PT: 11.3 sec;   INR: 1.01 ratio         PTT - ( 20 Mar 2024 06:36 )  PTT:32.3 sec  [id number]
"HPI: 32 y/o female w/ PMH breast cancer on chemotherapy (last chemo within 1 week ago) c/o 1 day history of fever, nausea, and NBNB vomiting. Pt is otherwise asymptomatic. Denies dizziness, chest pain, SOB, abdominal pain, dysuria, hematuria.    	Attendin-year-old female on chemotherapy for breast cancer, last chemo treatment on March 10 presents with fever and vomiting.  Patient has no pain complaints.  No dysuria.  No cough or congestion.  She has no skin rashes.  She was last here for a periorbital cellulitis and neutropenia last month."    Above reviewed. 32 yo F with history of breast CA on chemotherapy--last round 1 week ago, was seen in hospital 1 month ago with redness of eye and neutropenic fever following chemo, now here with fevers  Chemo 1 week ago, since then has had fever, N/V, no diarrhea  Has had some abd pain  No chest pain, no cough  No rash, no joint pains  No mucositis, no oral sores  Was on PO antibiotic PPX  Has mediport   at bedside  Otherwise well with no other focal symptoms to suggest source  ID called for further eval    PAST MEDICAL & SURGICAL HISTORY:  Breast cancer    Neutropenia    H/O breast surgery    Breast cancer metastasized to axillary lymph node    Port-A-Cath in place    Allergies    No Known Allergies    Intolerances    ANTIMICROBIALS:      OTHER MEDS:      SOCIAL HISTORY: No tobacco, no alcohol, no illicit drugs    FAMILY HISTORY:  No pertinent family history in first degree relatives relating to chief complaint    Drug Dosing Weight      PE:    Vital Signs Last 24 Hrs  T(C): 36.8 (19 Mar 2024 17:32), Max: 39.4 (19 Mar 2024 14:28)  T(F): 98.2 (19 Mar 2024 17:32), Max: 102.9 (19 Mar 2024 14:28)  HR: 116 (19 Mar 2024 17:32) (116 - 139)  BP: 99/63 (19 Mar 2024 17:32) (99/63 - 109/78)  RR: 16 (19 Mar 2024 17:32) (14 - 16)  SpO2: 99% (19 Mar 2024 17:32) (99% - 100%)    Gen: AOx3, NAD, non-toxic, pleasant  CV: S1+S2 normal, nontachycardic  Resp: Clear bilat, no resp distress, no crackles/wheezes  Abd: Soft, nontender, +BS  Ext: No LE edema, no wounds  : No Thomas  IV/Skin: No thrombophlebitis, L sided mediport--unaccessed  Msk: No low back pain, no arthralgias, no joint swelling  Neuro: No sensory deficits, no motor deficits    LABS:                        7.6    0.34  )-----------( 80       ( 19 Mar 2024 14:38 )             22.1         135  |  101  |  18  ----------------------------<  101<H>  4.5   |  24  |  0.75    Ca    9.4      19 Mar 2024 14:38    TPro  7.7  /  Alb  4.1  /  TBili  0.8  /  DBili  x   /  AST  94<H>  /  ALT  75<H>  /  AlkPhos  248<H>      Urinalysis Basic - ( 19 Mar 2024 14:38 )    Color: x / Appearance: x / SG: x / pH: x  Gluc: 101 mg/dL / Ketone: x  / Bili: x / Urobili: x   Blood: x / Protein: x / Nitrite: x   Leuk Esterase: x / RBC: x / WBC x   Sq Epi: x / Non Sq Epi: x / Bacteria: x    MICROBIOLOGY:    COVID, RSV, Flu neg    RADIOLOGY:    3/19 CXR    FINDINGS:    Left-sided port terminates in the superior vena cava.  The lungs are clear. No pleural effusion or pneumothorax.  Normal cardiomediastinal silhouette.  No acute bony pathology.    IMPRESSION:    Clear lungs.    3/19 USG    FINDINGS:  Liver: Within normal limits.  Bile ducts: Normal caliber. Common bile duct measures 3 mm.  Gallbladder: Within normal limits.  Pancreas: Visualized portions are within normal limits.  Right kidney: 9.2 cm. No hydronephrosis.  Ascites: None.  IVC: Visualized portions are within normal limits.    IMPRESSION:  Normal right upper quadrant abdominal ultrasound.

## 2024-03-21 NOTE — PROGRESS NOTE ADULT - ASSESSMENT
32 yo F with history of breast CA on chemotherapy--last round 1 week ago, was seen in hospital 1 month ago with redness of eye and neutropenic fever following chemo, now here with fevers  Fever, neutropenia  Chills, fevers, no other focal symptoms infection, no mucositis  L sided mediport  Prior Taxol/Carboplatin and received Keytruda/Adriamycin/Cytoxan, uncertain what most recent regimen given was  CXR clear  RUQ USG WNL  LFTs elevated  Most likely primary neutropenic fever without defined source, but monitor closely  Appears improved today, afebrile, no clinical signs infection  BCXs NGTD  Overall, Neutropenic fever, immunocompromised, chemo  - Cefepime 2g q 8  - DC Vanco  - F/U BCXs  - Monitor for further signs/symptoms infection  - If fevers not resolving or clinically worsening check CT Chest, CT A/P (with contrast)--can hold if continues to improve  - Trend CBC/ANC  - Typical duration of neutropenic fever coverage is 72 hours beyond ANC reconstitution, however, patient very well appearing. If team opting to discharge before reconstitution (in setting of ongoing reassuring status), can consider sending out on Cipro 500mg q 12 and Augmentin 875mg po q 12 (for ongoing neutropenic fever PPX, 2 weeks--would need follow up with Heme Onc for further eval at that point)    Valentino Sofia MD  Contact on TEAMS messaging from 9am - 5pm  From 5pm-9am, on weekends, or if no response call [phone] 32 yo F with history of breast CA on chemotherapy--last round 1 week ago, was seen in hospital 1 month ago with redness of eye and neutropenic fever following chemo, now here with fevers  Fever, neutropenia  Chills, fevers, no other focal symptoms infection, no mucositis  L sided mediport  Prior Taxol/Carboplatin and received Keytruda/Adriamycin/Cytoxan, uncertain what most recent regimen given was  CXR clear  RUQ USG WNL  LFTs elevated  Most likely primary neutropenic fever without defined source, but monitor closely  Appears improved today, afebrile, no clinical signs infection  BCXs NGTD  Overall, Neutropenic fever, immunocompromised, chemo  - Cefepime 2g q 8  - DC Vanco  - F/U BCXs  - Monitor for further signs/symptoms infection  - If fevers not resolving or clinically worsening check CT Chest, CT A/P (with contrast)--can hold if continues to improve  - Trend CBC/ANC  - Typical duration of neutropenic fever coverage is 72 hours beyond ANC reconstitution, however, patient very well appearing. If team opting to discharge before reconstitution (in setting of ongoing reassuring status), can consider sending out on Cipro 500mg q 12 and Augmentin 875mg po q 12 (for ongoing neutropenic fever PPX, 2 weeks--would need follow up with Heme Onc for further eval at that point--this plan presumes patient is reliable, has good follow up, and close contact with their Heme Onc provider)    Valentino Sofia MD  Contact on TEAMS messaging from 9am - 5pm  From 5pm-9am, on weekends, or if no response call [phone]

## 2024-03-22 LAB
ADD ON TEST-SPECIMEN IN LAB: SIGNIFICANT CHANGE UP
ANION GAP SERPL CALC-SCNC: 11 MMOL/L — SIGNIFICANT CHANGE UP (ref 7–14)
ANISOCYTOSIS BLD QL: SLIGHT — SIGNIFICANT CHANGE UP
BASOPHILS # BLD AUTO: 0 K/UL — SIGNIFICANT CHANGE UP (ref 0–0.2)
BASOPHILS NFR BLD AUTO: 0 % — SIGNIFICANT CHANGE UP (ref 0–2)
BUN SERPL-MCNC: 7 MG/DL — SIGNIFICANT CHANGE UP (ref 7–23)
CALCIUM SERPL-MCNC: 9.4 MG/DL — SIGNIFICANT CHANGE UP (ref 8.4–10.5)
CHLORIDE SERPL-SCNC: 102 MMOL/L — SIGNIFICANT CHANGE UP (ref 98–107)
CO2 SERPL-SCNC: 22 MMOL/L — SIGNIFICANT CHANGE UP (ref 22–31)
CREAT SERPL-MCNC: 0.58 MG/DL — SIGNIFICANT CHANGE UP (ref 0.5–1.3)
EGFR: 123 ML/MIN/1.73M2 — SIGNIFICANT CHANGE UP
EOSINOPHIL # BLD AUTO: 0 K/UL — SIGNIFICANT CHANGE UP (ref 0–0.5)
EOSINOPHIL NFR BLD AUTO: 0 % — SIGNIFICANT CHANGE UP (ref 0–6)
GLUCOSE SERPL-MCNC: 93 MG/DL — SIGNIFICANT CHANGE UP (ref 70–99)
HCT VFR BLD CALC: 25.3 % — LOW (ref 34.5–45)
HGB BLD-MCNC: 8.8 G/DL — LOW (ref 11.5–15.5)
IANC: 0.44 K/UL — LOW (ref 1.8–7.4)
LYMPHOCYTES # BLD AUTO: 0.48 K/UL — LOW (ref 1–3.3)
LYMPHOCYTES # BLD AUTO: 33 % — SIGNIFICANT CHANGE UP (ref 13–44)
MAGNESIUM SERPL-MCNC: 1.8 MG/DL — SIGNIFICANT CHANGE UP (ref 1.6–2.6)
MANUAL DIF COMMENT BLD-IMP: SIGNIFICANT CHANGE UP
MANUAL SMEAR VERIFICATION: SIGNIFICANT CHANGE UP
MCHC RBC-ENTMCNC: 29.9 PG — SIGNIFICANT CHANGE UP (ref 27–34)
MCHC RBC-ENTMCNC: 34.8 GM/DL — SIGNIFICANT CHANGE UP (ref 32–36)
MCV RBC AUTO: 86.1 FL — SIGNIFICANT CHANGE UP (ref 80–100)
MICROCYTES BLD QL: SLIGHT — SIGNIFICANT CHANGE UP
MONOCYTES # BLD AUTO: 0.39 K/UL — SIGNIFICANT CHANGE UP (ref 0–0.9)
MONOCYTES NFR BLD AUTO: 27 % — HIGH (ref 2–14)
MRSA PCR RESULT.: SIGNIFICANT CHANGE UP
MYELOCYTES NFR BLD: 1 % — HIGH (ref 0–0)
NEUTROPHILS # BLD AUTO: 0.54 K/UL — LOW (ref 1.8–7.4)
NEUTROPHILS NFR BLD AUTO: 31 % — LOW (ref 43–77)
NEUTS BAND # BLD: 6 % — SIGNIFICANT CHANGE UP (ref 0–6)
NRBC # BLD: 0 /100 WBCS — SIGNIFICANT CHANGE UP (ref 0–0)
NRBC # BLD: 0 /100 WBCS — SIGNIFICANT CHANGE UP (ref 0–0)
NRBC # FLD: 0 K/UL — SIGNIFICANT CHANGE UP (ref 0–0)
PHOSPHATE SERPL-MCNC: 3.3 MG/DL — SIGNIFICANT CHANGE UP (ref 2.5–4.5)
PLAT MORPH BLD: NORMAL — SIGNIFICANT CHANGE UP
PLATELET # BLD AUTO: 41 K/UL — LOW (ref 150–400)
PLATELET COUNT - ESTIMATE: ABNORMAL
POIKILOCYTOSIS BLD QL AUTO: SLIGHT — SIGNIFICANT CHANGE UP
POLYCHROMASIA BLD QL SMEAR: SLIGHT — SIGNIFICANT CHANGE UP
POTASSIUM SERPL-MCNC: 3.9 MMOL/L — SIGNIFICANT CHANGE UP (ref 3.5–5.3)
POTASSIUM SERPL-SCNC: 3.9 MMOL/L — SIGNIFICANT CHANGE UP (ref 3.5–5.3)
RBC # BLD: 2.94 M/UL — LOW (ref 3.8–5.2)
RBC # FLD: 16.8 % — HIGH (ref 10.3–14.5)
RBC BLD AUTO: ABNORMAL
S AUREUS DNA NOSE QL NAA+PROBE: SIGNIFICANT CHANGE UP
SODIUM SERPL-SCNC: 135 MMOL/L — SIGNIFICANT CHANGE UP (ref 135–145)
VARIANT LYMPHS # BLD: 2 % — SIGNIFICANT CHANGE UP (ref 0–6)
WBC # BLD: 1.45 K/UL — LOW (ref 3.8–10.5)
WBC # FLD AUTO: 1.45 K/UL — LOW (ref 3.8–10.5)

## 2024-03-22 PROCEDURE — 99232 SBSQ HOSP IP/OBS MODERATE 35: CPT

## 2024-03-22 RX ORDER — CHLORHEXIDINE GLUCONATE 213 G/1000ML
1 SOLUTION TOPICAL
Refills: 0 | Status: DISCONTINUED | OUTPATIENT
Start: 2024-03-22 | End: 2024-03-24

## 2024-03-22 RX ADMIN — CEFEPIME 100 MILLIGRAM(S): 1 INJECTION, POWDER, FOR SOLUTION INTRAMUSCULAR; INTRAVENOUS at 13:51

## 2024-03-22 RX ADMIN — CEFEPIME 100 MILLIGRAM(S): 1 INJECTION, POWDER, FOR SOLUTION INTRAMUSCULAR; INTRAVENOUS at 05:20

## 2024-03-22 RX ADMIN — CEFEPIME 100 MILLIGRAM(S): 1 INJECTION, POWDER, FOR SOLUTION INTRAMUSCULAR; INTRAVENOUS at 21:40

## 2024-03-22 RX ADMIN — CHLORHEXIDINE GLUCONATE 1 APPLICATION(S): 213 SOLUTION TOPICAL at 05:21

## 2024-03-22 NOTE — DISCHARGE NOTE PROVIDER - ATTENDING ATTESTATION STATEMENT
Physical Therapy Visit    Referred by: Debra Britt CNM; Medical Diagnosis (from order):    Diagnosis Information      Diagnosis    839.69 (ICD-9-CM) - S33.4XXA (ICD-10-CM) - Symphysis pubis disruption              Visit: 4    Visit Type: Daily Treatment Note  Patient alert and oriented X3.    SUBJECTIVE                                                                                                               The patient delivered on 07/12/2022, and is currently 6 weeks post partum. The patient states that she still has pubic symphysis pain. The patient states that she thought it would go away after delivery, but it has not improved. The patient still has pain with lifting her lower extremity, go up stairs, walking, and is unable to sleep on her side or back (sleeps in a recliner).  Pain / Symptoms:  Pain rating (out of 10): Current: 6     OBJECTIVE                                                                                                                                    TREATMENT                                                                                                                  Therapeutic Exercise:  + Hip adduction with pelvic floor contraction 5 sec hold x 10 x 2  + Clamshell with red theraband x 10 x 2  + Posterior pelvic tilt x 10 x 2  + Transversus abdominus activation 5 sec hold x 10 x 2    += new therapeutic exercises  X= not performed/performed at home      Manual Therapy:  Soft tissue mobilization/myofascial release to bilateral hip adductor muscles    Skilled input: verbal instruction/cues    Writer verbally educated and received verbal consent for hand placement, positioning of patient, and techniques to be performed today from patient for clothing adjustments for techniques, hand placement and palpation for techniques and therapist position for techniques as described above and how they are pertinent to the patient's plan of care.    Home Exercise Program/Education Materials:  Access Code: [email or web address]  URL: [email or web address]  Date: 08/23/2022  Prepared by: Roz Johnston    Exercises  · Supine Hip Adduction Isometric with Ball - 1 x daily - 7 x weekly - 2 sets - 10 reps - 5 sec hold  · Hooklying Clamshell with Resistance - 1 x daily - 7 x weekly - 2 sets - 10 reps  · Supine Transversus Abdominis Bracing - Hands on Stomach - 1 x daily - 7 x weekly - 2 sets - 10 reps  · Supine Posterior Pelvic Tilt - 1 x daily - 7 x weekly - 2 sets - 10 reps             ASSESSMENT                                                                                                             The patient was given strengthening exercises to help stabilize her pelvis as the patient could have pubic symphysis laxity that is causing her pain. The patient was able to tolerate the theraetuc exercuses well and did not report an increase in pain when performing them. The patient had tenderness to palpation and myofascial restrictions on her bilateral hip adductor muscles. However, the patient had increased tone and tenderness on her left side when compared to her right. The patient tolerated the treatment session well and reported a decrease in pain at the end of the treatment session. The patient can continue to benefit from physical therapy to decrease their pain and improve their strength. We will continue to progress per plan of care.  Pain/symptoms after session (out of 10): 4  Patient Education:   Results of above outlined education: Verbalizes understanding and Demonstrates understanding      PLAN                                                                                                                           Updates to plan of care: extend current plan of care    Frequency / Duration: 1 times per week tapering as patient progresses  for an estimated additional 3 visits for additional 4 weeks    Patient involved in and agreed to plan of care and goals.  Suggestions for next  session as indicated: Progress per plan of care           Therapy procedure time and total treatment time can be found documented on the Time Entry flowsheet   I have personally seen and examined the patient. I have collaborated with and supervised the

## 2024-03-22 NOTE — DISCHARGE NOTE PROVIDER - NSDCFUADDAPPT_GEN_ALL_CORE_FT
Follow up with your primary care provider in 1-2 weeks of discharge.    Follow up with your oncologist within 1 week of discharge.

## 2024-03-22 NOTE — PROGRESS NOTE ADULT - ASSESSMENT
33-year-old female with pancytopenia and neutropenic fever post AC for triple negative breast cancer. She is getting neoadjuvant therapy with hope for eventual surgery. No more fever. On IV abx. ID following. WBC rising today but still neutropenic. Expect it to be better tomorrow or the next day. No Neupogen as has Neulasta. Hgb and platelets adequate and can monitor. Out-pt f/u with primary hem/onc for further management.

## 2024-03-22 NOTE — PROGRESS NOTE ADULT - ASSESSMENT
34 yo F with history of breast CA on chemotherapy--last round 1 week ago, was seen in hospital 1 month ago with redness of eye and neutropenic fever following chemo, now here with fevers  Fever, neutropenia  Chills, fevers, no other focal symptoms infection, no mucositis  L sided mediport  Prior Taxol/Carboplatin and received Keytruda/Adriamycin/Cytoxan, uncertain what most recent regimen given was  CXR clear  RUQ USG WNL  LFTs elevated  Most likely primary neutropenic fever without defined source source infection  Appears improved today, afebrile, no clinical signs infection  BCXs NGTD  Overall, Neutropenic fever, immunocompromised, chemo  - Cefepime 2g q 8 continue until ANC >500 x 72 hours and no fevers then discontinue  - If team opting to discharge before reconstitution as above (in setting of ongoing reassuring status), can consider sending out on Cipro 500mg q 12 and Augmentin 875mg po q 12 (for ongoing neutropenic fever PPX, 1 week), follow up with Heme Onc provider  - F/U BCXs  - Monitor for further signs/symptoms infection  - If fevers not resolving or clinically worsening check CT Chest, CT A/P (with contrast)--can hold if continues to improve  - Trend CBC/ANC    My colleagues will be covering this patient on 3/23/24. I will return 3/27/24. Please call [phone] or on call fellow with any questions or change in status.     Valentino Sofia MD  Contact on TEAMS messaging from 9am - 5pm  From 5pm-9am, on weekends, or if no response call [phone]

## 2024-03-22 NOTE — DISCHARGE NOTE PROVIDER - CARE PROVIDER_API CALL
Mick Andrade  [facility]  [address] [place] [address]  Phone: [phone]  Fax: [phone]  Follow Up Time:

## 2024-03-22 NOTE — DISCHARGE NOTE PROVIDER - NSDCCPCAREPLAN_GEN_ALL_CORE_FT
PRINCIPAL DISCHARGE DIAGNOSIS  Diagnosis: Neutropenic fever  Assessment and Plan of Treatment: You came to the hospital due to fevers and nausea/vomiting. You were found to be neutropenic (low white blood cell counts), likely due to recent chemotherapy. You were seen by the infectious disease team who recommended a course of antibiotics. Continue medications as prescibed. Follow up with your primary care provider in 1-2 weeks of discharge for further monitoring of your blood counts.      SECONDARY DISCHARGE DIAGNOSES  Diagnosis: Pancytopenia  Assessment and Plan of Treatment: Your blood counts were low, in the setting of recent chemotherapy. You received a unit of blood during this admission. Follow up with your primary care provider and oncologist for further monitoring.    Diagnosis: Breast cancer  Assessment and Plan of Treatment: Follow up with your oncologist for further management.

## 2024-03-22 NOTE — DISCHARGE NOTE PROVIDER - HOSPITAL COURSE
33F, Manjula speaking, PMH of breast cancer on chemotherapy (last chemo within 1 week ago) c/o 1 day history of fever, nausea, and NBNB vomiting, admitted for neutropenic fever.    Neutropenic fever  - no source identifiable, patient s/p recent chemotherapy  - BCx 3/19, 3/20 negative  - ID consulted: c/w cefepime --> transition to cipro 500mg q12h, augmentin 875mg q12h x2 weeks on discharge for empiric treatment    Breast cancer  - s/p recent chemotherapy 1 week ago; she is getting neoadjuvant therapy with hope for eventual surgery  - onc consulted: no need for G-CSF as patient received neulasta recently; outpatient follow up with primary oncologist    Transaminitis  - RUQ US wnl  - no abdominal pain  - monitor LFTs    Pancytopenia  - likely secondary to chemotherapy   - neutropenic, anemic, thrombocytopenic  - s/p 1U PRBCs 3/20  - monitor ANC, Hgb, plts    Case discussed with Dr. Pennington on ___. Patient is medically stable and optimized for discharge as per attending. All medications were reviewed and prescriptions were sent to a mutually agreed upon pharmacy. Discharge plan reviewed with patient and/or family. 33F, Manjula speaking, PMH of breast cancer on chemotherapy (last chemo within 1 week ago) c/o 1 day history of fever, nausea, and NBNB vomiting, Admitted for neutropenic fever.    Neutropenic fever  - no source identifiable, patient s/p recent chemotherapy  - BCx 3/19, 3/20 negative  - ID consulted: c/w cefepime --> transition to cipro 500mg q12h, Augmentin 875mg q12h x 2 weeks on discharge for empiric treatment    Breast cancer  - s/p recent chemotherapy 1 week ago; she is getting neoadjuvant therapy with hope for eventual surgery  - onc consulted: no need for G-CSF as patient received neulasta recently; outpatient follow up with primary oncologist    Transaminitis  - RUQ US wnl  - no abdominal pain  - monitor LFTs    Pancytopenia  - likely secondary to chemotherapy   - neutropenic, anemic, thrombocytopenic  - s/p 1U PRBCs 3/20  - monitor ANC, Hgb, plts    Case discussed with Dr. Pennington on ___. Patient is medically stable and optimized for discharge as per attending. All medications were reviewed and prescriptions were sent to a mutually agreed upon pharmacy. Discharge plan reviewed with patient and/or family. 33F, Manjula speaking, PMH of breast cancer on chemotherapy (last chemo within 1 week ago) c/o 1 day history of fever, nausea, and NBNB vomiting, Admitted for neutropenic fever.    Neutropenic fever  - no source identifiable, patient s/p recent chemotherapy  - BCx 3/19, 3/20 negative  - ID consulted: c/w cefepime --> transition to cipro 500mg q12h, Augmentin 875mg q12h x 2 weeks on discharge for empiric treatment    Breast cancer  - s/p recent chemotherapy 1 week ago; she is getting neoadjuvant therapy with hope for eventual surgery  - onc consulted: no need for G-CSF as patient received neulasta recently; outpatient follow up with primary oncologist    Transaminitis  - RUQ US wnl  - no abdominal pain  - monitor LFTs    Pancytopenia  - likely secondary to chemotherapy   - neutropenic, anemic, thrombocytopenic  - s/p 1U PRBCs 3/20  - monitor ANC, Hgb, plts    Case discussed with Dr. Pennington on 3/24/24.  Patient is medically stable and optimized for discharge. All medications were reviewed and prescriptions were sent to a mutually agreed upon pharmacy. Discharge plan reviewed with patient and/or family. 33F, Manjula speaking, PMH of breast cancer on chemotherapy (last chemo within 1 week ago) c/o 1 day history of fever, nausea, and NBNB vomiting, Admitted for neutropenic fever.    Neutropenic fever  - no source identifiable, patient s/p recent chemotherapy  - BCx 3/19, 3/20 negative  - ID consulted: c/w cefepime --> no further antibiotics for discharge    Breast cancer  - s/p recent chemotherapy 1 week ago; she is getting neoadjuvant therapy with hope for eventual surgery  - onc consulted: no need for G-CSF as patient received neulasta recently; outpatient follow up with primary oncologist    Transaminitis  - RUQ US wnl  - no abdominal pain  - monitor LFTs    Pancytopenia  - likely secondary to chemotherapy   - neutropenic, anemic, thrombocytopenic  - s/p 1U PRBCs 3/20  - monitor ANC, Hgb, plts    Case discussed with Dr. Pennington on 3/24/24.  Patient is medically stable and optimized for discharge. All medications were reviewed and prescriptions were sent to a mutually agreed upon pharmacy. Discharge plan reviewed with patient and/or family.

## 2024-03-23 LAB
ALBUMIN SERPL ELPH-MCNC: 3.8 G/DL — SIGNIFICANT CHANGE UP (ref 3.3–5)
ALP SERPL-CCNC: 173 U/L — HIGH (ref 40–120)
ALT FLD-CCNC: 21 U/L — SIGNIFICANT CHANGE UP (ref 4–33)
ANION GAP SERPL CALC-SCNC: 10 MMOL/L — SIGNIFICANT CHANGE UP (ref 7–14)
AST SERPL-CCNC: 15 U/L — SIGNIFICANT CHANGE UP (ref 4–32)
BASOPHILS # BLD AUTO: 0 K/UL — SIGNIFICANT CHANGE UP (ref 0–0.2)
BASOPHILS NFR BLD AUTO: 0 % — SIGNIFICANT CHANGE UP (ref 0–2)
BILIRUB SERPL-MCNC: 0.2 MG/DL — SIGNIFICANT CHANGE UP (ref 0.2–1.2)
BLD GP AB SCN SERPL QL: NEGATIVE — SIGNIFICANT CHANGE UP
BUN SERPL-MCNC: 8 MG/DL — SIGNIFICANT CHANGE UP (ref 7–23)
CALCIUM SERPL-MCNC: 9.4 MG/DL — SIGNIFICANT CHANGE UP (ref 8.4–10.5)
CHLORIDE SERPL-SCNC: 104 MMOL/L — SIGNIFICANT CHANGE UP (ref 98–107)
CO2 SERPL-SCNC: 21 MMOL/L — LOW (ref 22–31)
CREAT SERPL-MCNC: 0.54 MG/DL — SIGNIFICANT CHANGE UP (ref 0.5–1.3)
EGFR: 125 ML/MIN/1.73M2 — SIGNIFICANT CHANGE UP
EOSINOPHIL # BLD AUTO: 0 K/UL — SIGNIFICANT CHANGE UP (ref 0–0.5)
EOSINOPHIL NFR BLD AUTO: 0 % — SIGNIFICANT CHANGE UP (ref 0–6)
GIANT PLATELETS BLD QL SMEAR: PRESENT — SIGNIFICANT CHANGE UP
GLUCOSE SERPL-MCNC: 96 MG/DL — SIGNIFICANT CHANGE UP (ref 70–99)
HCT VFR BLD CALC: 27.4 % — LOW (ref 34.5–45)
HGB BLD-MCNC: 9.4 G/DL — LOW (ref 11.5–15.5)
IANC: 3.49 K/UL — SIGNIFICANT CHANGE UP (ref 1.8–7.4)
LYMPHOCYTES # BLD AUTO: 0.46 K/UL — LOW (ref 1–3.3)
LYMPHOCYTES # BLD AUTO: 7 % — LOW (ref 13–44)
MAGNESIUM SERPL-MCNC: 1.9 MG/DL — SIGNIFICANT CHANGE UP (ref 1.6–2.6)
MANUAL SMEAR VERIFICATION: SIGNIFICANT CHANGE UP
MCHC RBC-ENTMCNC: 29.3 PG — SIGNIFICANT CHANGE UP (ref 27–34)
MCHC RBC-ENTMCNC: 34.3 GM/DL — SIGNIFICANT CHANGE UP (ref 32–36)
MCV RBC AUTO: 85.4 FL — SIGNIFICANT CHANGE UP (ref 80–100)
MONOCYTES # BLD AUTO: 1.26 K/UL — HIGH (ref 0–0.9)
MONOCYTES NFR BLD AUTO: 19.3 % — HIGH (ref 2–14)
MYELOCYTES NFR BLD: 0.9 % — HIGH (ref 0–0)
NEUTROPHILS # BLD AUTO: 4.68 K/UL — SIGNIFICANT CHANGE UP (ref 1.8–7.4)
NEUTROPHILS NFR BLD AUTO: 71.9 % — SIGNIFICANT CHANGE UP (ref 43–77)
PHOSPHATE SERPL-MCNC: 3.4 MG/DL — SIGNIFICANT CHANGE UP (ref 2.5–4.5)
PLAT MORPH BLD: NORMAL — SIGNIFICANT CHANGE UP
PLATELET # BLD AUTO: 52 K/UL — LOW (ref 150–400)
PLATELET COUNT - ESTIMATE: ABNORMAL
POTASSIUM SERPL-MCNC: 3.6 MMOL/L — SIGNIFICANT CHANGE UP (ref 3.5–5.3)
POTASSIUM SERPL-SCNC: 3.6 MMOL/L — SIGNIFICANT CHANGE UP (ref 3.5–5.3)
PROT SERPL-MCNC: 7.3 G/DL — SIGNIFICANT CHANGE UP (ref 6–8.3)
RBC # BLD: 3.21 M/UL — LOW (ref 3.8–5.2)
RBC # FLD: 17.2 % — HIGH (ref 10.3–14.5)
RBC BLD AUTO: NORMAL — SIGNIFICANT CHANGE UP
RH IG SCN BLD-IMP: POSITIVE — SIGNIFICANT CHANGE UP
SODIUM SERPL-SCNC: 135 MMOL/L — SIGNIFICANT CHANGE UP (ref 135–145)
VARIANT LYMPHS # BLD: 0.9 % — SIGNIFICANT CHANGE UP (ref 0–6)
WBC # BLD: 6.51 K/UL — SIGNIFICANT CHANGE UP (ref 3.8–10.5)
WBC # FLD AUTO: 6.51 K/UL — SIGNIFICANT CHANGE UP (ref 3.8–10.5)

## 2024-03-23 RX ORDER — CIPROFLOXACIN LACTATE 400MG/40ML
1 VIAL (ML) INTRAVENOUS
Qty: 14 | Refills: 0
Start: 2024-03-23 | End: 2024-03-29

## 2024-03-23 RX ADMIN — BENZOCAINE AND MENTHOL 1 LOZENGE: 5; 1 LIQUID ORAL at 14:33

## 2024-03-23 RX ADMIN — CHLORHEXIDINE GLUCONATE 1 APPLICATION(S): 213 SOLUTION TOPICAL at 05:49

## 2024-03-23 RX ADMIN — CEFEPIME 100 MILLIGRAM(S): 1 INJECTION, POWDER, FOR SOLUTION INTRAMUSCULAR; INTRAVENOUS at 14:05

## 2024-03-23 RX ADMIN — CEFEPIME 100 MILLIGRAM(S): 1 INJECTION, POWDER, FOR SOLUTION INTRAMUSCULAR; INTRAVENOUS at 05:49

## 2024-03-23 RX ADMIN — CEFEPIME 100 MILLIGRAM(S): 1 INJECTION, POWDER, FOR SOLUTION INTRAMUSCULAR; INTRAVENOUS at 21:15

## 2024-03-23 NOTE — PROGRESS NOTE ADULT - PROBLEM SELECTOR PLAN 1
Overall, Neutropenic fever, immunocompromised, chemo  - Cefepime 2g q 8  - DC Vanco  - F/U BCXs  - Monitor for further signs/symptoms infection  - If fevers not resolving or clinically worsening check CT Chest, CT A/P (with contrast)--can hold if continues to improve

## 2024-03-23 NOTE — PROGRESS NOTE ADULT - SUBJECTIVE AND OBJECTIVE BOX
SUBJECTIVE / OVERNIGHT EVENTS:pt seen and examined  03-21-24     MEDICATIONS  (STANDING):  cefepime   IVPB 2000 milliGRAM(s) IV Intermittent every 8 hours    MEDICATIONS  (PRN):  acetaminophen     Tablet .. 650 milliGRAM(s) Oral every 6 hours PRN Mild Pain (1 - 3)  benzocaine/menthol Lozenge 1 Lozenge Oral three times a day PRN Sore Throat  melatonin 3 milliGRAM(s) Oral at bedtime PRN Insomnia    T(C): 36.6 (03-21-24 @ 14:33), Max: 36.9 (03-21-24 @ 11:04)  HR: 101 (03-21-24 @ 14:33) (97 - 113)  BP: 113/78 (03-21-24 @ 14:33) (109/67 - 113/78)  RR: 18 (03-21-24 @ 14:33) (16 - 18)  SpO2: 100% (03-21-24 @ 14:33) (100% - 100%)    CAPILLARY BLOOD GLUCOSE        I&O's Summary      Constitutional: No fever, fatigue  Skin: No rash.  Eyes: No recent vision problems or eye pain.  ENT: No congestion, ear pain, or sore throat.  Cardiovascular: No chest pain or palpation.  Respiratory: No cough, shortness of breath, congestion, or wheezing.  Gastrointestinal: No abdominal pain, nausea, vomiting, or diarrhea.  Genitourinary: No dysuria.  Musculoskeletal: No joint swelling.  Neurologic: No headache.    PHYSICAL EXAM:  GENERAL: NAD  EYES: EOMI, PERRLA  NECK: Supple, No JVD  CHEST/LUNG: dec breath sounds rt base  HEART:  S1 , S2 +  ABDOMEN: soft , bs+  EXTREMITIES: no edema   NEUROLOGY:alert awake      LABS:                        9.0    0.55  )-----------( 47       ( 21 Mar 2024 06:01 )             25.9     03-21    134<L>  |  102  |  8   ----------------------------<  92  3.5   |  21<L>  |  0.56    Ca    9.3      21 Mar 2024 06:01  Phos  3.0     03-21  Mg     1.80     03-21    TPro  6.4  /  Alb  3.3  /  TBili  0.6  /  DBili  x   /  AST  34<H>  /  ALT  48<H>  /  AlkPhos  180<H>  03-20    PT/INR - ( 20 Mar 2024 06:36 )   PT: 11.3 sec;   INR: 1.01 ratio         PTT - ( 20 Mar 2024 06:36 )  PTT:32.3 sec      Urinalysis Basic - ( 21 Mar 2024 06:01 )    Color: x / Appearance: x / SG: x / pH: x  Gluc: 92 mg/dL / Ketone: x  / Bili: x / Urobili: x   Blood: x / Protein: x / Nitrite: x   Leuk Esterase: x / RBC: x / WBC x   Sq Epi: x / Non Sq Epi: x / Bacteria: x        RADIOLOGY & ADDITIONAL TESTS:    Imaging Personally Reviewed:    Consultant(s) Notes Reviewed:      Care Discussed with Consultants/Other Providers:  
  Patient is a 33y old  Female who presents with a chief complaint of neutropenic fever (21 Mar 2024 21:18)    says that she feels fine. No fever. No HA or dizziness. No bleeding. No N/V/D. No CP, SOB or cough.     Medication:   acetaminophen     Tablet .. 650 milliGRAM(s) Oral every 6 hours PRN  benzocaine/menthol Lozenge 1 Lozenge Oral three times a day PRN  cefepime   IVPB 2000 milliGRAM(s) IV Intermittent every 8 hours  chlorhexidine 2% Cloths 1 Application(s) Topical <User Schedule>  melatonin 3 milliGRAM(s) Oral at bedtime PRN      Physical exam    T(C): 36.6 (03-22-24 @ 05:03), Max: 36.8 (03-21-24 @ 21:42)  HR: 94 (03-22-24 @ 05:03) (82 - 102)  BP: 97/63 (03-22-24 @ 05:03) (97/63 - 116/73)  RR: 17 (03-22-24 @ 05:03) (17 - 18)  SpO2: 100% (03-22-24 @ 02:16) (99% - 100%)  Wt(kg): --    alert NAD  EOMI anicteric sclera  Cv s1 S2 RRR  Lungs clear B/L  abd soft NT ND +BS  No LE edema or tenderness    Labs                       8.8    1.45  )-----------( 41       ( 22 Mar 2024 06:05 )             25.3       03-22    135  |  102  |  7   ----------------------------<  93  3.9   |  22  |  0.58    Ca    9.4      22 Mar 2024 06:05  Phos  3.3     03-22  Mg     1.80     03-22            [id number]
CC: F/U for Neutropenic fever    Saw/spoke to patient. No fevers, no chills. No new complaints.    Allergies  No Known Allergies    ANTIMICROBIALS:  cefepime   IVPB 2000 every 8 hours  vancomycin  IVPB 1000 every 12 hours    PE:    Vital Signs Last 24 Hrs  T(C): 36.6 (21 Mar 2024 14:33), Max: 36.9 (20 Mar 2024 17:00)  T(F): 97.9 (21 Mar 2024 14:33), Max: 98.4 (20 Mar 2024 17:00)  HR: 101 (21 Mar 2024 14:33) (97 - 113)  BP: 113/78 (21 Mar 2024 14:33) (100/62 - 115/75)  BP(mean): 86 (20 Mar 2024 17:00) (59 - 86)  RR: 18 (21 Mar 2024 14:33) (16 - 19)  SpO2: 100% (21 Mar 2024 14:33) (98% - 100%)    Gen: AOx3, NAD, non-toxic  CV: Nontachycardic  Resp: Breathing comfortably, RA  Abd: Soft, nontender  IV/Skin: No thrombophlebitis    LABS:                        9.0    0.55  )-----------( 47       ( 21 Mar 2024 06:01 )             25.9     03-21    134<L>  |  102  |  8   ----------------------------<  92  3.5   |  21<L>  |  0.56    Ca    9.3      21 Mar 2024 06:01  Phos  3.0     03-21  Mg     1.80     03-21    TPro  6.4  /  Alb  3.3  /  TBili  0.6  /  DBili  x   /  AST  34<H>  /  ALT  48<H>  /  AlkPhos  180<H>  03-20    Urinalysis Basic - ( 21 Mar 2024 06:01 )    Color: x / Appearance: x / SG: x / pH: x  Gluc: 92 mg/dL / Ketone: x  / Bili: x / Urobili: x   Blood: x / Protein: x / Nitrite: x   Leuk Esterase: x / RBC: x / WBC x   Sq Epi: x / Non Sq Epi: x / Bacteria: x    MICROBIOLOGY:  Vancomycin Level, Trough: 13.0 ug/mL (03-21-24 @ 11:15)  Vancomycin Level, Trough: 14.0 ug/mL (03-20-24 @ 23:12)    .Blood Blood-Peripheral  03-20-24   No growth at 24 hours  --  --    .Blood Blood-Peripheral  03-20-24   No growth at 24 hours  --  --    .Blood Blood-Peripheral  03-19-24   No growth at 24 hours  --  --    .Blood Blood-Peripheral  03-19-24   No growth at 24 hours  --  --    RADIOLOGY:    3/19 [facility]G    FINDINGS:  Liver: Within normal limits.  Bile ducts: Normal caliber. Common bile duct measures 3 mm.  Gallbladder: Within normal limits.  Pancreas: Visualized portions are within normal limits.  Right kidney: 9.2 cm. No hydronephrosis.  Ascites: None.  IVC: Visualized portions are within normal limits.    IMPRESSION:  Normal right upper quadrant abdominal ultrasound.
Chief Complaint: Patient spoke in junior was able to communicate with her.  She was feeling well today.    History of Present Illness:    The patient has no chest pain.  No pleuritic chest pain.  No abdominal pain.  No nausea or vomiting she did have it with the chemo.  No fevers.  No chills.  No diarrhea.  No constipation.  No severe bone pains.  No leg swelling.  No calf pain . She feels her energy overall is a little better today.  Remainder ROS is stable.     MEDICATIONS  (STANDING):  cefepime   IVPB 2000 milliGRAM(s) IV Intermittent every 8 hours  chlorhexidine 2% Cloths 1 Application(s) Topical <User Schedule>    MEDICATIONS  (PRN):  acetaminophen     Tablet .. 650 milliGRAM(s) Oral every 6 hours PRN Mild Pain (1 - 3)  benzocaine/menthol Lozenge 1 Lozenge Oral three times a day PRN Sore Throat  melatonin 3 milliGRAM(s) Oral at bedtime PRN Insomnia      Allergies    No Known Allergies    Intolerances        Vital Signs Last 24 Hrs  T(C): 36.8 (23 Mar 2024 05:09), Max: 36.8 (22 Mar 2024 20:56)  T(F): 98.2 (23 Mar 2024 05:09), Max: 98.3 (23 Mar 2024 00:54)  HR: 100 (23 Mar 2024 05:09) (96 - 113)  BP: 105/71 (23 Mar 2024 05:09) (93/57 - 120/79)  BP(mean): --  RR: 18 (23 Mar 2024 05:09) (17 - 18)  SpO2: 98% (23 Mar 2024 05:09) (98% - 100%)    Parameters below as of 23 Mar 2024 05:09  Patient On (Oxygen Delivery Method): room air        PHYSICAL EXAM  General: NAD  HEENT: clear oropharynx, anicteric sclera, pink conjunctiva  Neck: supple  CV: normal S1/S2 with no murmur rubs or gallops  Lungs: clear to auscultation  Abdomen: soft non-tender non-distended, no hepatosplenomegaly, positive bowel sounds  Ext: no edema  Skin: no rashes and no petechiae  Lymph Nodes: No LAD in neck  Neuro: alert and oriented X 3, no focal deficits    LABS:                          8.8    1.45  )-----------( 41       ( 22 Mar 2024 06:05 )             25.3         Mean Cell Volume : 86.1 fL  Mean Cell Hemoglobin : 29.9 pg  Mean Cell Hemoglobin Concentration : 34.8 gm/dL  Auto Neutrophil # : 0.54 K/uL  Auto Lymphocyte # : 0.48 K/uL  Auto Monocyte # : 0.39 K/uL  Auto Eosinophil # : 0.00 K/uL  Auto Basophil # : 0.00 K/uL  Auto Neutrophil % : 31.0 %  Auto Lymphocyte % : 33.0 %  Auto Monocyte % : 27.0 %  Auto Eosinophil % : 0.0 %  Auto Basophil % : 0.0 %      Serial CBC's  03-22 @ 06:05  Hct-25.3 / Hgb-8.8 / Plat-41 / RBC-2.94 / WBC-1.45  Serial CBC's  03-21 @ 06:01  Hct-25.9 / Hgb-9.0 / Plat-47 / RBC-3.02 / WBC-0.55  Serial CBC's  03-20 @ 23:12  Hct-24.7 / Hgb-8.6 / Plat-56 / RBC-2.88 / WBC-0.52  Serial CBC's  03-20 @ 10:10  Hct-20.4 / Hgb-6.6 / Plat-56 / RBC-2.16 / WBC-0.33  Serial CBC's  03-20 @ 06:36  Hct-20.2 / Hgb-6.7 / Plat-60 / RBC-2.23 / WBC-0.36  Serial CBC's  03-19 @ 14:38  Hct-22.1 / Hgb-7.6 / Plat-80 / RBC-2.42 / WBC-0.34      03-23    135  |  104  |  8   ----------------------------<  96  3.6   |  21<L>  |  0.54    Ca    9.4      23 Mar 2024 07:22  Phos  3.4     03-23  Mg     1.90     03-23    TPro  7.3  /  Alb  3.8  /  TBili  0.2  /  DBili  x   /  AST  15  /  ALT  21  /  AlkPhos  173<H>  03-23                        
    SUBJECTIVE / OVERNIGHT EVENTS:pt seen and examined  03-22-24     MEDICATIONS  (STANDING):  cefepime   IVPB 2000 milliGRAM(s) IV Intermittent every 8 hours  chlorhexidine 2% Cloths 1 Application(s) Topical <User Schedule>    MEDICATIONS  (PRN):  acetaminophen     Tablet .. 650 milliGRAM(s) Oral every 6 hours PRN Mild Pain (1 - 3)  benzocaine/menthol Lozenge 1 Lozenge Oral three times a day PRN Sore Throat  melatonin 3 milliGRAM(s) Oral at bedtime PRN Insomnia    Vital Signs Last 24 Hrs  T(C): 36.8 (03-22-24 @ 20:56), Max: 36.8 (03-22-24 @ 20:56)  T(F): 98.2 (03-22-24 @ 20:56), Max: 98.2 (03-22-24 @ 20:56)  HR: 113 (03-22-24 @ 20:56) (82 - 113)  BP: 120/79 (03-22-24 @ 20:56) (97/63 - 120/79)  BP(mean): --  RR: 17 (03-22-24 @ 20:56) (17 - 18)  SpO2: 100% (03-22-24 @ 20:56) (98% - 100%)        Constitutional: No fever, fatigue  Skin: No rash.  Eyes: No recent vision problems or eye pain.  ENT: No congestion, ear pain, or sore throat.  Cardiovascular: No chest pain or palpation.  Respiratory: No cough, shortness of breath, congestion, or wheezing.  Gastrointestinal: No abdominal pain, nausea, vomiting, or diarrhea.  Genitourinary: No dysuria.  Musculoskeletal: No joint swelling.  Neurologic: No headache.    PHYSICAL EXAM:  GENERAL: NAD  EYES: EOMI, PERRLA  NECK: Supple, No JVD  CHEST/LUNG: dec breath sounds rt base  HEART:  S1 , S2 +  ABDOMEN: soft , bs+  EXTREMITIES: no edema   NEUROLOGY:alert awake      LABS:  03-22    135  |  102  |  7   ----------------------------<  93  3.9   |  22  |  0.58    Ca    9.4      22 Mar 2024 06:05  Phos  3.3     03-22  Mg     1.80     03-22      Creatinine Trend: 0.58 <--, 0.56 <--, 0.59 <--, 0.75 <--                        8.8    1.45  )-----------( 41       ( 22 Mar 2024 06:05 )             25.3     Urine Studies:  Urinalysis Basic - ( 22 Mar 2024 06:05 )    Color:  / Appearance:  / SG:  / pH:   Gluc: 93 mg/dL / Ketone:   / Bili:  / Urobili:    Blood:  / Protein:  / Nitrite:    Leuk Esterase:  / RBC:  / WBC    Sq Epi:  / Non Sq Epi:  / Bacteria:                         RADIOLOGY & ADDITIONAL TESTS:    Imaging Personally Reviewed:    Consultant(s) Notes Reviewed:      Care Discussed with Consultants/Other Providers:  
CC: F/U for Neutropenic Fever    Saw/spoke to patient. No fevers, no chills. No new complaints.    Allergies  No Known Allergies    ANTIMICROBIALS:  cefepime   IVPB 2000 every 8 hours    PE:    Vital Signs Last 24 Hrs  T(C): 36.7 (22 Mar 2024 09:00), Max: 36.8 (21 Mar 2024 21:42)  T(F): 98.1 (22 Mar 2024 09:00), Max: 98.3 (21 Mar 2024 21:42)  HR: 104 (22 Mar 2024 09:00) (82 - 104)  BP: 102/68 (22 Mar 2024 09:00) (97/63 - 116/73)  RR: 17 (22 Mar 2024 09:00) (17 - 18)  SpO2: 99% (22 Mar 2024 09:00) (99% - 100%)    Gen: AOx3, NAD, non-toxic  CV: Nontachycardic  Resp: Breathing comfortably, RA  Abd: Soft, nontender  IV/Skin: No thrombophlebitis    LABS:                        8.8    1.45  )-----------( 41       ( 22 Mar 2024 06:05 )             25.3     03-22    135  |  102  |  7   ----------------------------<  93  3.9   |  22  |  0.58    Ca    9.4      22 Mar 2024 06:05  Phos  3.3     03-22  Mg     1.80     03-22    Urinalysis Basic - ( 22 Mar 2024 06:05 )    Color: x / Appearance: x / SG: x / pH: x  Gluc: 93 mg/dL / Ketone: x  / Bili: x / Urobili: x   Blood: x / Protein: x / Nitrite: x   Leuk Esterase: x / RBC: x / WBC x   Sq Epi: x / Non Sq Epi: x / Bacteria: x    MICROBIOLOGY:    .Blood Blood-Peripheral  03-20-24   No growth at 48 Hours  --  --    .Blood Blood-Peripheral  03-20-24   No growth at 48 Hours  --  --    .Blood Blood-Peripheral  03-19-24   No growth at 48 Hours  --  --    .Blood Blood-Peripheral  03-19-24   No growth at 48 Hours  --  --    RADIOLOGY:    3/19 [facility]    IMPRESSION:  Normal right upper quadrant abdominal ultrasound.  
CC: Neutropenic Fever    Saw/spoke to patient. Still fevers, no chills. No new complaints.    Allergies  No Known Allergies    ANTIMICROBIALS:  cefepime   IVPB 2000 every 8 hours  vancomycin  IVPB 1000 every 12 hours    PE:    Vital Signs Last 24 Hrs  T(C): 36.9 (20 Mar 2024 09:05), Max: 39.4 (19 Mar 2024 14:28)  T(F): 98.5 (20 Mar 2024 09:05), Max: 102.9 (19 Mar 2024 14:28)  HR: 118 (20 Mar 2024 09:05) (104 - 139)  BP: 110/71 (20 Mar 2024 09:05) (99/63 - 114/75)  RR: 18 (20 Mar 2024 09:05) (14 - 18)  SpO2: 100% (20 Mar 2024 09:05) (99% - 100%)    Gen: AOx3, NAD, non-toxic  CV: Nontachycardic  Resp: Breathing comfortably, RA  Abd: Soft, nontender  IV/Skin: No thrombophlebitis    LABS:                        6.6    0.33  )-----------( 56       ( 20 Mar 2024 10:10 )             20.4     03-20    136  |  105  |  10  ----------------------------<  112<H>  3.8   |  20<L>  |  0.59    Ca    8.7      20 Mar 2024 06:36    TPro  6.4  /  Alb  3.3  /  TBili  0.6  /  DBili  x   /  AST  34<H>  /  ALT  48<H>  /  AlkPhos  180<H>  03-20    Urinalysis Basic - ( 20 Mar 2024 06:36 )    Color: x / Appearance: x / SG: x / pH: x  Gluc: 112 mg/dL / Ketone: x  / Bili: x / Urobili: x   Blood: x / Protein: x / Nitrite: x   Leuk Esterase: x / RBC: x / WBC x   Sq Epi: x / Non Sq Epi: x / Bacteria: x    MICROBIOLOGY:    BCXs pending    RADIOLOGY:    3/19 US    FINDINGS:  Liver: Within normal limits.  Bile ducts: Normal caliber. Common bile duct measures 3 mm.  Gallbladder: Within normal limits.  Pancreas: Visualized portions are within normal limits.  Right kidney: 9.2 cm. No hydronephrosis.  Ascites: None.  IVC: Visualized portions are within normal limits.    IMPRESSION:  Normal right upper quadrant abdominal ultrasound.
pt was evaluated by hospitalist earlier during the day   vaishali/ SONAL stone
    SUBJECTIVE / OVERNIGHT EVENTS:pt seen and examined  03-23-24     MEDICATIONS  (STANDING):  cefepime   IVPB 2000 milliGRAM(s) IV Intermittent every 8 hours  chlorhexidine 2% Cloths 1 Application(s) Topical <User Schedule>    MEDICATIONS  (PRN):  acetaminophen     Tablet .. 650 milliGRAM(s) Oral every 6 hours PRN Mild Pain (1 - 3)  benzocaine/menthol Lozenge 1 Lozenge Oral three times a day PRN Sore Throat  melatonin 3 milliGRAM(s) Oral at bedtime PRN Insomnia    ICU Vital Signs Last 24 Hrs  T(C): 36.8 (24 Mar 2024 00:02), Max: 36.8 (23 Mar 2024 00:54)  T(F): 98.2 (24 Mar 2024 00:02), Max: 98.3 (23 Mar 2024 00:54)  HR: 93 (24 Mar 2024 00:02) (93 - 100)  BP: 110/75 (24 Mar 2024 00:02) (93/57 - 112/77)  BP(mean): --  ABP: --  ABP(mean): --  RR: 17 (24 Mar 2024 00:02) (17 - 18)  SpO2: 100% (24 Mar 2024 00:02) (98% - 100%)    O2 Parameters below as of 24 Mar 2024 00:02  Patient On (Oxygen Delivery Method): room air              Constitutional: No fever, fatigue  Skin: No rash.  Eyes: No recent vision problems or eye pain.  ENT: No congestion, ear pain, or sore throat.  Cardiovascular: No chest pain or palpation.  Respiratory: No cough, shortness of breath, congestion, or wheezing.  Gastrointestinal: No abdominal pain, nausea, vomiting, or diarrhea.  Genitourinary: No dysuria.  Musculoskeletal: No joint swelling.  Neurologic: No headache.    PHYSICAL EXAM:  GENERAL: NAD  EYES: EOMI, PERRLA  NECK: Supple, No JVD  CHEST/LUNG: dec breath sounds rt base  HEART:  S1 , S2 +  ABDOMEN: soft , bs+  EXTREMITIES: no edema   NEUROLOGY:alert awake    LABS:  03-23    135  |  104  |  8   ----------------------------<  96  3.6   |  21<L>  |  0.54    Ca    9.4      23 Mar 2024 07:22  Phos  3.4     03-23  Mg     1.90     03-23    TPro  7.3  /  Alb  3.8  /  TBili  0.2  /  DBili      /  AST  15  /  ALT  21  /  AlkPhos  173<H>  03-23    Creatinine Trend: 0.54 <--, 0.58 <--, 0.56 <--, 0.59 <--, 0.75 <--                        9.4    6.51  )-----------( 52       ( 23 Mar 2024 07:22 )             27.4     Urine Studies:  Urinalysis Basic - ( 23 Mar 2024 07:22 )    Color:  / Appearance:  / SG:  / pH:   Gluc: 96 mg/dL / Ketone:   / Bili:  / Urobili:    Blood:  / Protein:  / Nitrite:    Leuk Esterase:  / RBC:  / WBC    Sq Epi:  / Non Sq Epi:  / Bacteria:               LIVER FUNCTIONS - ( 23 Mar 2024 07:22 )  Alb: 3.8 g/dL / Pro: 7.3 g/dL / ALK PHOS: 173 U/L / ALT: 21 U/L / AST: 15 U/L / GGT: x                       RADIOLOGY & ADDITIONAL TESTS:    Imaging Personally Reviewed:    Consultant(s) Notes Reviewed:      Care Discussed with Consultants/Other Providers:  
    SUBJECTIVE / OVERNIGHT EVENTS:pt seen and examined  03-23-24     MEDICATIONS  (STANDING):  cefepime   IVPB 2000 milliGRAM(s) IV Intermittent every 8 hours  chlorhexidine 2% Cloths 1 Application(s) Topical <User Schedule>    MEDICATIONS  (PRN):  acetaminophen     Tablet .. 650 milliGRAM(s) Oral every 6 hours PRN Mild Pain (1 - 3)  benzocaine/menthol Lozenge 1 Lozenge Oral three times a day PRN Sore Throat  melatonin 3 milliGRAM(s) Oral at bedtime PRN Insomnia    Vital Signs Last 24 Hrs  T(C): 36.8 (03-24-24 @ 00:02), Max: 36.8 (03-23-24 @ 00:54)  T(F): 98.2 (03-24-24 @ 00:02), Max: 98.3 (03-23-24 @ 00:54)  HR: 93 (03-24-24 @ 00:02) (93 - 100)  BP: 110/75 (03-24-24 @ 00:02) (93/57 - 112/77)  BP(mean): --  RR: 17 (03-24-24 @ 00:02) (17 - 18)  SpO2: 100% (03-24-24 @ 00:02) (98% - 100%)    Skin: No rash.  Eyes: No recent vision problems or eye pain.  ENT: No congestion, ear pain, or sore throat.  Cardiovascular: No chest pain or palpation.  Respiratory: No cough, shortness of breath, congestion, or wheezing.  Gastrointestinal: No abdominal pain, nausea, vomiting, or diarrhea.  Genitourinary: No dysuria.  Musculoskeletal: No joint swelling.  Neurologic: No headache.    PHYSICAL EXAM:  GENERAL: NAD  EYES: EOMI, PERRLA  NECK: Supple, No JVD  CHEST/LUNG: dec breath sounds rt base  HEART:  S1 , S2 +  ABDOMEN: soft , bs+  EXTREMITIES: no edema   NEUROLOGY:alert awake      LABS:  03-23    135  |  104  |  8   ----------------------------<  96  3.6   |  21<L>  |  0.54    Ca    9.4      23 Mar 2024 07:22  Phos  3.4     03-23  Mg     1.90     03-23    TPro  7.3  /  Alb  3.8  /  TBili  0.2  /  DBili      /  AST  15  /  ALT  21  /  AlkPhos  173<H>  03-23    Creatinine Trend: 0.54 <--, 0.58 <--, 0.56 <--, 0.59 <--, 0.75 <--                        9.4    6.51  )-----------( 52       ( 23 Mar 2024 07:22 )             27.4     Urine Studies:  Urinalysis Basic - ( 23 Mar 2024 07:22 )    Color:  / Appearance:  / SG:  / pH:   Gluc: 96 mg/dL / Ketone:   / Bili:  / Urobili:    Blood:  / Protein:  / Nitrite:    Leuk Esterase:  / RBC:  / WBC    Sq Epi:  / Non Sq Epi:  / Bacteria:               LIVER FUNCTIONS - ( 23 Mar 2024 07:22 )  Alb: 3.8 g/dL / Pro: 7.3 g/dL / ALK PHOS: 173 U/L / ALT: 21 U/L / AST: 15 U/L / GGT: x             Sq Epi:  / Non Sq Epi:  / Bacteria:                         RADIOLOGY & ADDITIONAL TESTS:    Imaging Personally Reviewed:    Consultant(s) Notes Reviewed:      Care Discussed with Consultants/Other Providers:

## 2024-03-23 NOTE — PROGRESS NOTE ADULT - ASSESSMENT
33-year-old female with pancytopenia and neutropenic fever post AC for triple negative breast cancer.        1.  Pancytopenia secondary to chemo  -    neutropenic fever on IV abxs cefepime  -    No neuopgen as she had neulasta with the chmemotheraopy  -    Will monitor CBC daily and from today on 3-23-24 is pending      2.    Triple Negative Breast Cancer  -      S/P neoadjuvant AC with neulasta  -      Monitor blood counts post chemo  -      Eventual surgery post chemo     Clinically patient feeling better.  follow up CBC from today.

## 2024-03-23 NOTE — PROGRESS NOTE ADULT - PROVIDER SPECIALTY LIST ADULT
Infectious Disease
Internal Medicine
Heme/Onc
Internal Medicine
Heme/Onc
Infectious Disease
Infectious Disease
Internal Medicine

## 2024-03-23 NOTE — PROGRESS NOTE ADULT - REASON FOR ADMISSION
neutropenic fever

## 2024-03-23 NOTE — PROGRESS NOTE ADULT - PROBLEM SELECTOR PLAN 3
DVT prophylaxis: SCD bilaterally and OOB given thrombocytopenia, fall precautions   GI prophylaxis: none

## 2024-03-23 NOTE — CHART NOTE - NSCHARTNOTEFT_GEN_A_CORE
Provider discussed plan with heme onc . CBC resulted with improvement in ANC 3.49 , WBC 6.51, HGB 9.4, Platelet 52. Neutropenic fevers resolved. Per ID note yesterday patient - Cefepime 2g q 8 continue until ANC >500 x 72 hours and no fevers then discontinue  If team opting to discharge before reconstitution as above (in setting of ongoing reassuring status), can consider sending out on Cipro 500mg q 12 and Augmentin 875mg po q 12 (for ongoing neutropenic fever PPX, 1 week).  Patient cleared for discharge from oncology standpoint. Will discuss duration of antibiotics given ANC >500 x 1 day. Provider discussed plan with heme onc . CBC resulted with improvement in ANC 3.49 , WBC 6.51, HGB 9.4, Platelet 52. Neutropenic fevers resolved. Per ID note yesterday patient - Cefepime 2g q 8 continue until ANC >500 x 72 hours and no fevers then discontinue  If team opting to discharge before reconstitution as above (in setting of ongoing reassuring status), can consider sending out on Cipro 500mg q 12 and Augmentin 875mg po q 12 (for ongoing neutropenic fever PPX, 1 week).  Patient cleared for discharge from oncology standpoint. Will discuss duration of antibiotics given ANC >500 x 2 day.

## 2024-03-24 VITALS
TEMPERATURE: 98 F | DIASTOLIC BLOOD PRESSURE: 77 MMHG | RESPIRATION RATE: 18 BRPM | SYSTOLIC BLOOD PRESSURE: 116 MMHG | OXYGEN SATURATION: 100 % | HEART RATE: 99 BPM

## 2024-03-24 LAB
ALBUMIN SERPL ELPH-MCNC: 3.6 G/DL — SIGNIFICANT CHANGE UP (ref 3.3–5)
ALP SERPL-CCNC: 162 U/L — HIGH (ref 40–120)
ALT FLD-CCNC: 20 U/L — SIGNIFICANT CHANGE UP (ref 4–33)
ANION GAP SERPL CALC-SCNC: 10 MMOL/L — SIGNIFICANT CHANGE UP (ref 7–14)
AST SERPL-CCNC: 17 U/L — SIGNIFICANT CHANGE UP (ref 4–32)
BASOPHILS # BLD AUTO: 0.07 K/UL — SIGNIFICANT CHANGE UP (ref 0–0.2)
BASOPHILS NFR BLD AUTO: 0.9 % — SIGNIFICANT CHANGE UP (ref 0–2)
BILIRUB SERPL-MCNC: <0.2 MG/DL — SIGNIFICANT CHANGE UP (ref 0.2–1.2)
BUN SERPL-MCNC: 9 MG/DL — SIGNIFICANT CHANGE UP (ref 7–23)
CALCIUM SERPL-MCNC: 9.4 MG/DL — SIGNIFICANT CHANGE UP (ref 8.4–10.5)
CHLORIDE SERPL-SCNC: 103 MMOL/L — SIGNIFICANT CHANGE UP (ref 98–107)
CO2 SERPL-SCNC: 24 MMOL/L — SIGNIFICANT CHANGE UP (ref 22–31)
CREAT SERPL-MCNC: 0.56 MG/DL — SIGNIFICANT CHANGE UP (ref 0.5–1.3)
CULTURE RESULTS: SIGNIFICANT CHANGE UP
CULTURE RESULTS: SIGNIFICANT CHANGE UP
EGFR: 124 ML/MIN/1.73M2 — SIGNIFICANT CHANGE UP
EOSINOPHIL # BLD AUTO: 0.01 K/UL — SIGNIFICANT CHANGE UP (ref 0–0.5)
EOSINOPHIL NFR BLD AUTO: 0.1 % — SIGNIFICANT CHANGE UP (ref 0–6)
GLUCOSE SERPL-MCNC: 109 MG/DL — HIGH (ref 70–99)
HCT VFR BLD CALC: 26.6 % — LOW (ref 34.5–45)
HGB BLD-MCNC: 9.1 G/DL — LOW (ref 11.5–15.5)
IANC: 4.09 K/UL — SIGNIFICANT CHANGE UP (ref 1.8–7.4)
IMM GRANULOCYTES NFR BLD AUTO: 14 % — HIGH (ref 0–0.9)
LYMPHOCYTES # BLD AUTO: 0.76 K/UL — LOW (ref 1–3.3)
LYMPHOCYTES # BLD AUTO: 9.3 % — LOW (ref 13–44)
MAGNESIUM SERPL-MCNC: 2 MG/DL — SIGNIFICANT CHANGE UP (ref 1.6–2.6)
MCHC RBC-ENTMCNC: 30 PG — SIGNIFICANT CHANGE UP (ref 27–34)
MCHC RBC-ENTMCNC: 34.2 GM/DL — SIGNIFICANT CHANGE UP (ref 32–36)
MCV RBC AUTO: 87.8 FL — SIGNIFICANT CHANGE UP (ref 80–100)
MONOCYTES # BLD AUTO: 2.08 K/UL — HIGH (ref 0–0.9)
MONOCYTES NFR BLD AUTO: 25.5 % — HIGH (ref 2–14)
NEUTROPHILS # BLD AUTO: 4.09 K/UL — SIGNIFICANT CHANGE UP (ref 1.8–7.4)
NEUTROPHILS NFR BLD AUTO: 50.2 % — SIGNIFICANT CHANGE UP (ref 43–77)
NRBC # BLD: 0 /100 WBCS — SIGNIFICANT CHANGE UP (ref 0–0)
NRBC # FLD: 0 K/UL — SIGNIFICANT CHANGE UP (ref 0–0)
PHOSPHATE SERPL-MCNC: 4.6 MG/DL — HIGH (ref 2.5–4.5)
PLATELET # BLD AUTO: 63 K/UL — LOW (ref 150–400)
POTASSIUM SERPL-MCNC: 3.9 MMOL/L — SIGNIFICANT CHANGE UP (ref 3.5–5.3)
POTASSIUM SERPL-SCNC: 3.9 MMOL/L — SIGNIFICANT CHANGE UP (ref 3.5–5.3)
PROT SERPL-MCNC: 7.2 G/DL — SIGNIFICANT CHANGE UP (ref 6–8.3)
RBC # BLD: 3.03 M/UL — LOW (ref 3.8–5.2)
RBC # FLD: 17 % — HIGH (ref 10.3–14.5)
SODIUM SERPL-SCNC: 137 MMOL/L — SIGNIFICANT CHANGE UP (ref 135–145)
SPECIMEN SOURCE: SIGNIFICANT CHANGE UP
SPECIMEN SOURCE: SIGNIFICANT CHANGE UP
WBC # BLD: 8.15 K/UL — SIGNIFICANT CHANGE UP (ref 3.8–10.5)
WBC # FLD AUTO: 8.15 K/UL — SIGNIFICANT CHANGE UP (ref 3.8–10.5)

## 2024-03-24 RX ADMIN — CHLORHEXIDINE GLUCONATE 1 APPLICATION(S): 213 SOLUTION TOPICAL at 05:21

## 2024-03-24 RX ADMIN — CEFEPIME 100 MILLIGRAM(S): 1 INJECTION, POWDER, FOR SOLUTION INTRAMUSCULAR; INTRAVENOUS at 05:21

## 2024-03-24 RX ADMIN — CEFEPIME 100 MILLIGRAM(S): 1 INJECTION, POWDER, FOR SOLUTION INTRAMUSCULAR; INTRAVENOUS at 14:11

## 2024-03-24 NOTE — DISCHARGE NOTE NURSING/CASE MANAGEMENT/SOCIAL WORK - PATIENT PORTAL LINK FT
You can access the FollowMyHealth Patient Portal offered by [facility] by registering at the following website: [email or web address]. By joining [facility]’s FollowMyHealth portal, you will also be able to view your health information using other applications (apps) compatible with our system.

## 2024-03-24 NOTE — DISCHARGE NOTE NURSING/CASE MANAGEMENT/SOCIAL WORK - NSDCPEFALRISK_GEN_ALL_CORE
For information on Fall & Injury Prevention, visit: [email or web address] OR  [email or web address] OR  https://www.cdc.gov/steadi/patient.html
